# Patient Record
Sex: FEMALE | Race: WHITE | NOT HISPANIC OR LATINO | Employment: OTHER | URBAN - METROPOLITAN AREA
[De-identification: names, ages, dates, MRNs, and addresses within clinical notes are randomized per-mention and may not be internally consistent; named-entity substitution may affect disease eponyms.]

---

## 2017-06-20 ENCOUNTER — OFFICE VISIT (OUTPATIENT)
Dept: OPTOMETRY | Facility: CLINIC | Age: 77
End: 2017-06-20
Payer: COMMERCIAL

## 2017-06-20 DIAGNOSIS — H26.9 CATARACTS, BOTH EYES: ICD-10-CM

## 2017-06-20 DIAGNOSIS — H52.202 ASTIGMATISM OF LEFT EYE: ICD-10-CM

## 2017-06-20 DIAGNOSIS — H43.813 POSTERIOR VITREOUS DETACHMENT, BILATERAL: ICD-10-CM

## 2017-06-20 DIAGNOSIS — H52.4 MYOPIA WITH PRESBYOPIA OF BOTH EYES: Primary | ICD-10-CM

## 2017-06-20 DIAGNOSIS — H52.13 MYOPIA WITH PRESBYOPIA OF BOTH EYES: Primary | ICD-10-CM

## 2017-06-20 PROCEDURE — 92015 DETERMINE REFRACTIVE STATE: CPT | Performed by: OPTOMETRIST

## 2017-06-20 PROCEDURE — 92014 COMPRE OPH EXAM EST PT 1/>: CPT | Performed by: OPTOMETRIST

## 2017-06-20 ASSESSMENT — VISUAL ACUITY
OS_CC: 20/40
OS_CC: 20/40
OS_CC+: -1
OD_CC+: -2
OS_SC: 20/200
OD_CC: 20/60
CORRECTION_TYPE: GLASSES
METHOD: SNELLEN - LINEAR
OS_SC: 20/40
OD_CC: 20/40
OD_SC: 20/60
OD_SC: 20/150

## 2017-06-20 ASSESSMENT — REFRACTION_MANIFEST
OD_ADD: +3.00
OS_CYLINDER: +1.50
OD_CYLINDER: SPHERE
OS_ADD: +3.00
OD_SPHERE: -1.50
OS_AXIS: 125
OS_SPHERE: -2.50

## 2017-06-20 ASSESSMENT — SLIT LAMP EXAM - LIDS
COMMENTS: 2+ DERMATOCHALASIS - UPPER LID
COMMENTS: 2+ DERMATOCHALASIS - UPPER LID, 1+ SCLERAL SHOW

## 2017-06-20 ASSESSMENT — EXTERNAL EXAM - RIGHT EYE: OD_EXAM: 2+ BROW PTOSIS

## 2017-06-20 ASSESSMENT — CUP TO DISC RATIO
OD_RATIO: 0.3
OS_RATIO: 0.4

## 2017-06-20 ASSESSMENT — REFRACTION_WEARINGRX
OD_CYLINDER: +0.75
OD_SPHERE: -1.25
OD_ADD: +3.00
OS_SPHERE: -1.75
OS_AXIS: 120
OS_ADD: +3.00
OS_CYLINDER: +1.00
OD_AXIS: 070

## 2017-06-20 ASSESSMENT — CONF VISUAL FIELD
OS_NORMAL: 1
OD_NORMAL: 1

## 2017-06-20 ASSESSMENT — TONOMETRY
OS_IOP_MMHG: 18
OD_IOP_MMHG: 18
IOP_METHOD: APPLANATION

## 2017-06-20 ASSESSMENT — EXTERNAL EXAM - LEFT EYE: OS_EXAM: 2+ BROW PTOSIS

## 2017-06-20 NOTE — PROGRESS NOTES
Chief Complaint   Patient presents with     COMPREHENSIVE EYE EXAM      Accompanied by self  Last Eye Exam: 1.5 years ago  Dilated Previously: Yes    What are you currently using to see?  glasses       Distance Vision Acuity: Noticed gradual change in both eyes, night vision is difficult has quit driving at night due to glare, long distance signs very difficult to see.  Patient is getting frustrated with vision, seems hazy especially in the morning.    Near Vision Acuity: Satisfied with vision while reading  with glasses    Eye Comfort: good, but  dry at night  Do you use eye drops? : No  Occupation or Hobbies: retired    Sybil Coats, [employer]          Medical, surgical and family histories reviewed and updated 6/20/2017.       OBJECTIVE: See Ophthalmology exam    ASSESSMENT:    ICD-10-CM    1. Myopia with presbyopia of both eyes H52.13 EYE EXAM (SIMPLE-NONBILLABLE)    H52.4 REFRACTION   2. Astigmatism of left eye H52.202 EYE EXAM (SIMPLE-NONBILLABLE)     REFRACTION   3. Cataracts, both eyes H26.9 EYE EXAM (SIMPLE-NONBILLABLE)     OPHTHALMOLOGY ADULT REFERRAL   4. Posterior vitreous detachment, bilateral H43.813 EYE EXAM (SIMPLE-NONBILLABLE)      PLAN:   A final glasses prescription was given.  Fill only if not considering cataract surgery.  Allow time for adaptation.    Referral to ophthalmology for cataract evaluation.    A posterior vitreous detachment is nothing to worry about.  You have a jelly like substance that fills the inside of the eye, as you get older, that gel shrinks a little and when it shrinks, it pulls away from the back of the eye.  When it pulls away you can see a floater, as time goes on, the floater may shrink down out of your line of sight and you won't notice it so often.  There is a little greater risk of developing a retinal detachment since there's nothing pressing against the retina, so if you start to notice flashes of light or sudden vision changes, return to the clinic as  soon as possible, otherwise return as needed.    Return to clinic 1 year for Comprehensive Vision Exam      Cordelia tSafford O.D  [address]  [place]  [address]    [phone]

## 2017-06-20 NOTE — Clinical Note
Andres Coates, I'm referring this patient for a cataract evaluation.  She has given up driving at night due to glare and just says she's very frustrated with her vision.  When I did the refraction today she was still able to read 20/30 plus a few letters with each eye but said it seemed hazy. I didn't do glare testing. She has an appointment set up with you on 8/4/17. Thanks, Cordelia

## 2017-06-20 NOTE — MR AVS SNAPSHOT
After Visit Summary   6/20/2017    Alisa Ballard    MRN: [id number]           Patient Information     Date Of Birth          1940        Visit Information        Provider Department      6/20/2017 11:00 AM Cordelia Stafford OD [facility]        Today's Diagnoses     Myopia with presbyopia of both eyes    -  1    Astigmatism of left eye        Cataracts, both eyes        Posterior vitreous detachment, bilateral          Care Instructions        A final glasses prescription was given.  Fill only if not considering cataract surgery.  Allow time for adaptation.    Referral to ophthalmology for cataract evaluation.    A posterior vitreous detachment is nothing to worry about.  You have a jelly like substance that fills the inside of the eye, as you get older, that gel shrinks a little and when it shrinks, it pulls away from the back of the eye.  When it pulls away you can see a floater, as time goes on, the floater may shrink down out of your line of sight and you won't notice it so often.  There is a little greater risk of developing a retinal detachment since there's nothing pressing against the retina, so if you start to notice flashes of light or sudden vision changes, return to the clinic as soon as possible, otherwise return as needed.    Return to clinic 1 year for Comprehensive Vision Exam      Cordelia Stafford O.D  [facility]  [address] [place] [address]. [place]  [address]    [phone]                  Follow-ups after your visit        Additional Services     OPHTHALMOLOGY ADULT REFERRAL       Your provider has referred you to:  [facility]: [facility] [phone]   [email or web address]      Please be aware that coverage of these services is subject to the terms and limitations of your health insurance plan.  Call member services at your health plan with any benefit or coverage questions.      Please bring the following to  "your appointment:  >>   Any x-rays, CTs or MRIs which have been performed.  Contact the facility where they were done to arrange for  prior to your scheduled appointment.  Any new CT, MRI or other procedures ordered by your specialist must be performed at a [place] facility or coordinated by your clinic's referral office.    >>   List of current medications   >>   This referral request   >>   Any documents/labs given to you for this referral                  Follow-up notes from your care team     Return in about 1 year (around 6/20/2018) for Eye Exam.      Who to contact     If you have questions or need follow up information about today's clinic visit or your schedule please contact [facility] directly at [phone].  Normal or non-critical lab and imaging results will be communicated to you by MyChart, letter or phone within 4 business days after the clinic has received the results. If you do not hear from us within 7 days, please contact the clinic through Myfacepagehart or phone. If you have a critical or abnormal lab result, we will notify you by phone as soon as possible.  Submit refill requests through Sawtooth Ideas or call your pharmacy and they will forward the refill request to us. Please allow 3 business days for your refill to be completed.          Additional Information About Your Visit        Sawtooth Ideas Information     Sawtooth Ideas lets you send messages to your doctor, view your test results, renew your prescriptions, schedule appointments and more. To sign up, go to [email or web address] . Click on \"Log in\" on the left side of the screen, which will take you to the Welcome page. Then click on \"Sign up Now\" on the right side of the page.     You will be asked to enter the access code listed below, as well as some personal information. Please follow the directions to create your username and password.     Your access code is: [email or web address]  Expires: 9/18/2017 12:27 PM     Your access code will "  in 90 days. If you need help or a new code, please call your [facility] clinic or [phone].        Care EveryWhere ID     This is your Care EveryWhere ID. This could be used by other organizations to access your [facility] medical records  [id number]         Blood Pressure from Last 3 Encounters:   13 128/82   08/10/10 98/68   07/13/10 120/73    Weight from Last 3 Encounters:   13 75.8 kg (167 lb)   08/10/10 78.7 kg (173 lb 9.6 oz)   07/13/10 81.3 kg (179 lb 3.2 oz)              We Performed the Following     EYE EXAM (SIMPLE-NONBILLABLE)     OPHTHALMOLOGY ADULT REFERRAL     REFRACTION        Primary Care Provider Office Phone # Fax #    [facility] [phone] [address] [facility] [address]        Thank you!     Thank you for choosing [facility]  for your care. Our goal is always to provide you with excellent care. Hearing back from our patients is one way we can continue to improve our services. Please take a few minutes to complete the written survey that you may receive in the mail after your visit with us. Thank you!             Your Updated Medication List - Protect others around you: Learn how to safely use, store and throw away your medicines at www.disposemymeds.org.          This list is accurate as of: 17 12:27 PM.  Always use your most recent med list.                   Brand Name Dispense Instructions for use    CALCIUM 500 +D PO      daily       GLUCOSAMINE HCL          MULTI-B COMPLEX PO      daily       MULTI-VITAMIN PO          VITAMIN D PO      None Entered

## 2017-06-20 NOTE — PATIENT INSTRUCTIONS
A final glasses prescription was given.  Fill only if not considering cataract surgery.  Allow time for adaptation.    Referral to ophthalmology for cataract evaluation.    A posterior vitreous detachment is nothing to worry about.  You have a jelly like substance that fills the inside of the eye, as you get older, that gel shrinks a little and when it shrinks, it pulls away from the back of the eye.  When it pulls away you can see a floater, as time goes on, the floater may shrink down out of your line of sight and you won't notice it so often.  There is a little greater risk of developing a retinal detachment since there's nothing pressing against the retina, so if you start to notice flashes of light or sudden vision changes, return to the clinic as soon as possible, otherwise return as needed.    Return to clinic 1 year for Comprehensive Vision Exam      Cordelia Stafford O.D  [facility]  [address] [place]  [address]    [phone]

## 2017-08-04 ENCOUNTER — OFFICE VISIT (OUTPATIENT)
Dept: OPHTHALMOLOGY | Facility: CLINIC | Age: 77
End: 2017-08-04
Payer: COMMERCIAL

## 2017-08-04 DIAGNOSIS — H25.813 COMBINED FORMS OF AGE-RELATED CATARACT OF BOTH EYES: Primary | ICD-10-CM

## 2017-08-04 DIAGNOSIS — H43.813 POSTERIOR VITREOUS DETACHMENT, BILATERAL: ICD-10-CM

## 2017-08-04 PROCEDURE — 92004 COMPRE OPH EXAM NEW PT 1/>: CPT | Performed by: OPHTHALMOLOGY

## 2017-08-04 ASSESSMENT — VISUAL ACUITY
METHOD: SNELLEN - LINEAR
OD_PH_CC: 20/40+2
OD_BAT_LOW: 20/30-1
OS_BAT_HIGH: 20/60-1
OD_CC: 20/60
OS_CC+: -2
OS_BAT_LOW: 20/40+2
OD_BAT_MED: 20/50-1
OD_CC+: +2
OS_CC: 20/40
OS_BAT_MED: 20/40+2
CORRECTION_TYPE: GLASSES
METHOD_MR: NO CHARGE, DIAGNOSTIC
OD_BAT_HIGH: 20/80-1

## 2017-08-04 ASSESSMENT — REFRACTION_MANIFEST
OD_SPHERE: -2.00
OS_AXIS: 135
OS_CYLINDER: +0.50
OS_SPHERE: -2.00
OD_AXIS: 180
OD_CYLINDER: +0.50

## 2017-08-04 ASSESSMENT — TONOMETRY
OS_IOP_MMHG: 19
OD_IOP_MMHG: 18
IOP_METHOD: APPLANATION

## 2017-08-04 ASSESSMENT — SLIT LAMP EXAM - LIDS
COMMENTS: 2+ DERMATOCHALASIS - UPPER LID, 1+ SCLERAL SHOW
COMMENTS: 2+ DERMATOCHALASIS - UPPER LID, 1+ SCLERAL SHOW

## 2017-08-04 ASSESSMENT — CUP TO DISC RATIO
OS_RATIO: 0.4
OD_RATIO: 0.3

## 2017-08-04 ASSESSMENT — REFRACTION_WEARINGRX
OS_AXIS: 120
SPECS_TYPE: BIFOCAL
OS_SPHERE: -1.75
OD_CYLINDER: +0.75
OD_AXIS: 070
OD_ADD: +3.00
OD_SPHERE: -1.25
OS_CYLINDER: +1.00
OS_ADD: +3.00

## 2017-08-04 ASSESSMENT — EXTERNAL EXAM - RIGHT EYE: OD_EXAM: 2+ BROW PTOSIS, MILD TO MOD BROW

## 2017-08-04 ASSESSMENT — EXTERNAL EXAM - LEFT EYE: OS_EXAM: 2+ BROW PTOSIS, MILD TO MOD BROW

## 2017-08-04 NOTE — PATIENT INSTRUCTIONS
Plan Kelman phacoemulsification/ posterior chamber lens right eye local standby.  Risks, benefits, complications, and alternatives discussed with patient including possibility of limitations from coexistent eye disease and loss of vision.  Target refraction and multifocal lens options discussed.  Patient understands and consents.  Eligio Coates M.D.

## 2017-08-04 NOTE — PROGRESS NOTES
Current Eye Medications:  None      Subjective:  Here for cataract eval, consult from Dr. Stafford.  Feels the right eye is worse, seems to be more blurred than the left eye.  Quit driving at night because she can't see with the glare.  You did her Mother's cataract surgery Nicole Ryder as well, she has been gone for 12 years. It made a tremendous difference for her eyesight.    RH.  GFT.     Objective:  See Ophthalmology Exam.       Assessment: Visually significant cataract right eye > left eye.      Plan:  Will proceed with cataract surgery right eye.  Plan Kelman phacoemulsification/ posterior chamber lens right eye local standby.  Risks, benefits, complications, and alternatives discussed with patient including possibility of limitations from coexistent eye disease and loss of vision.  Target refraction and multifocal lens options discussed.  Patient understands and consents.  Eligio Coates M.D.

## 2017-08-04 NOTE — MR AVS SNAPSHOT
"              After Visit Summary   8/4/2017    Alisa Ballard    MRN: [id number]           Patient Information     Date Of Birth          1940        Visit Information        Provider Department      8/4/2017 10:15 AM Eligio Coates MD [facility]        Today's Diagnoses     Posterior vitreous detachment, bilateral    -  1    Combined forms of age-related cataract of both eyes          Care Instructions    Plan Kelman phacoemulsification/ posterior chamber lens right eye local standby.  Risks, benefits, complications, and alternatives discussed with patient including possibility of limitations from coexistent eye disease and loss of vision.  Target refraction and multifocal lens options discussed.  Patient understands and consents.  Eligio Coates M.D.            Follow-ups after your visit        Who to contact     If you have questions or need follow up information about today's clinic visit or your schedule please contact [facility] directly at [phone].  Normal or non-critical lab and imaging results will be communicated to you by Sleepy'shart, letter or phone within 4 business days after the clinic has received the results. If you do not hear from us within 7 days, please contact the clinic through Sleepy'shart or phone. If you have a critical or abnormal lab result, we will notify you by phone as soon as possible.  Submit refill requests through Partnered or call your pharmacy and they will forward the refill request to us. Please allow 3 business days for your refill to be completed.          Additional Information About Your Visit        Sleepy'sharCareland Information     Partnered lets you send messages to your doctor, view your test results, renew your prescriptions, schedule appointments and more. To sign up, go to [email or web address] . Click on \"Log in\" on the left side of the screen, which will take you to the Welcome page. Then click on \"Sign up Now\" on the right side of the " page.     You will be asked to enter the access code listed below, as well as some personal information. Please follow the directions to create your username and password.     Your access code is: [email or web address]  Expires: 2017 12:27 PM     Your access code will  in 90 days. If you need help or a new code, please call your [facility] clinic or [phone].        Care EveryWhere ID     This is your Care EveryWhere ID. This could be used by other organizations to access your [facility] medical records  [id number]         Blood Pressure from Last 3 Encounters:   13 128/82   08/10/10 98/68   07/13/10 120/73    Weight from Last 3 Encounters:   13 75.8 kg (167 lb)   08/10/10 78.7 kg (173 lb 9.6 oz)   07/13/10 81.3 kg (179 lb 3.2 oz)              Today, you had the following     No orders found for display       Primary Care Provider Office Phone # Fax #    [facility] [phone] [address] [facility] [address]        Equal Access to Services     CAMPOS BOLANOS : Hadii aad ku hadasho Soomaali, waaxda luqadaha, qaybta kaalmada adeegyada, micky long. So [facility] [phone].    ATENCIÓN: Si habla español, tiene a edmond disposición servicios gratuitos de asistencia lingüística. [facility] [phone].    We comply with applicable federal civil rights laws and [place] laws. We do not discriminate on the basis of race, color, national origin, age, disability sex, sexual orientation or gender identity.            Thank you!     Thank you for choosing [facility]  for your care. Our goal is always to provide you with excellent care. Hearing back from our patients is one way we can continue to improve our services. Please take a few minutes to complete the written survey that you may receive in the mail after your visit with us. Thank you!             Your Updated Medication List - Protect others around you: Learn how to  safely use, store and throw away your medicines at www.disposemymeds.org.          This list is accurate as of: 8/4/17 11:23 AM.  Always use your most recent med list.                   Brand Name Dispense Instructions for use Diagnosis    CALCIUM 500 +D PO      daily        GLUCOSAMINE HCL           MULTI-B COMPLEX PO      daily        MULTI-VITAMIN PO           VITAMIN D PO      None Entered

## 2017-08-05 PROBLEM — H25.813 COMBINED FORMS OF AGE-RELATED CATARACT OF BOTH EYES: Status: ACTIVE | Noted: 2017-08-05

## 2017-09-07 ENCOUNTER — OFFICE VISIT (OUTPATIENT)
Dept: OPHTHALMOLOGY | Facility: CLINIC | Age: 77
End: 2017-09-07
Payer: COMMERCIAL

## 2017-09-07 DIAGNOSIS — H25.813 COMBINED FORMS OF AGE-RELATED CATARACT OF BOTH EYES: Primary | ICD-10-CM

## 2017-09-07 PROCEDURE — 92136 OPHTHALMIC BIOMETRY: CPT | Mod: TC | Performed by: OPHTHALMOLOGY

## 2017-09-07 PROCEDURE — 92012 INTRM OPH EXAM EST PATIENT: CPT | Performed by: OPHTHALMOLOGY

## 2017-09-07 RX ORDER — PREDNISOLONE ACETATE 10 MG/ML
1 SUSPENSION/ DROPS OPHTHALMIC 3 TIMES DAILY
Qty: 5 ML | Refills: 0 | Status: SHIPPED | OUTPATIENT
Start: 2017-09-15 | End: 2017-11-28

## 2017-09-07 RX ORDER — MOXIFLOXACIN 5 MG/ML
1 SOLUTION/ DROPS OPHTHALMIC 3 TIMES DAILY
Qty: 1 BOTTLE | Refills: 0 | Status: SHIPPED | OUTPATIENT
Start: 2017-09-13 | End: 2018-06-14

## 2017-09-07 RX ORDER — DICLOFENAC SODIUM 1 MG/ML
1 SOLUTION/ DROPS OPHTHALMIC 3 TIMES DAILY
Qty: 5 ML | Refills: 0 | Status: SHIPPED | OUTPATIENT
Start: 2017-09-15 | End: 2017-11-28

## 2017-09-07 ASSESSMENT — VISUAL ACUITY
OS_CC: 20/50
CORRECTION_TYPE: GLASSES
OS_CC+: -2
METHOD: SNELLEN - LINEAR
OD_CC: 20/60
OD_CC+: -1

## 2017-09-07 ASSESSMENT — EXTERNAL EXAM - LEFT EYE: OS_EXAM: 2+ BROW PTOSIS, MILD TO MOD BROW

## 2017-09-07 ASSESSMENT — EXTERNAL EXAM - RIGHT EYE: OD_EXAM: 2+ BROW PTOSIS, MILD TO MOD BROW

## 2017-09-07 NOTE — PROGRESS NOTES
Current Eye Medications:    None      Subjective:  Cataract surgery right eye 9/14/17. Vision is very blurred, or hazy right eye>left eye. Worse in morning. Zero pain, or discomfort both eyes.     Objective:  See Ophthalmology Exam.       Assessment: Visually significant cataract right eye.       Plan:  Plan Kelman phacoemulsification/ posterior chamber lens right eye local standby.  Risks, benefits, complications, and alternatives discussed with patient including possibility of limitations from coexistent eye disease and loss of vision.  Target refraction and multifocal lens options discussed.  Patient understands and consents.  Eligio Coates M.D.

## 2017-09-07 NOTE — PATIENT INSTRUCTIONS
PRE-OP CATARACT INSTRUCTIONS    *You will be picking up 3 eye drop bottles from your pharmacy.    *Use the following drops in the right eye  3 times a day the day before surgery and once the morning of surgery:                                    Vigamox (tan cap)    *If taking more than one drop, wait five minutes between drops.    *No solid food after midnight.    *Clear liquids: coffee (no cream), tea, water, and jello are OK before 6:30 A.M.  You may take your regular pills with this.    *If you are taking glaucoma drops, continue as usual.    Eligio Coates M.D.

## 2017-09-07 NOTE — MR AVS SNAPSHOT
After Visit Summary   9/7/2017    Alisa Ballard    MRN: [id number]           Patient Information     Date Of Birth          1940        Visit Information        Provider Department      9/7/2017 1:15 PM Eligio Coates MD [facility] [place]        Today's Diagnoses     Combined forms of age-related cataract of both eyes    -  1      Care Instructions    PRE-OP CATARACT INSTRUCTIONS    *You will be picking up 3 eye drop bottles from your pharmacy.    *Use the following drops in the right eye  3 times a day the day before surgery and once the morning of surgery:                                    Vigamox (tan cap)    *If taking more than one drop, wait five minutes between drops.    *No solid food after midnight.    *Clear liquids: coffee (no cream), tea, water, and jello are OK before 6:30 A.M.  You may take your regular pills with this.    *If you are taking glaucoma drops, continue as usual.    Eligio Coates M.D.              Follow-ups after your visit        Your next 10 appointments already scheduled     Sep 15, 2017 10:45 AM CDT   [facility]    [address] [place] [address]   [phone]            Sep 21, 2017  9:15 AM CDT   [facility]    [address] [place] [address]   [phone]            Oct 25, 2017 10:15 AM CDT   [facility] [place] ([address] [address]   [phone]              Who to contact     If you have questions or need follow up information about today's clinic visit or your schedule please contact [facility] directly at [phone].  Normal or non-critical lab and imaging results will be communicated to you by MyChart, letter or phone within 4 business days  "after the clinic has received the results. If you do not hear from us within 7 days, please contact the clinic through FanMiles or phone. If you have a critical or abnormal lab result, we will notify you by phone as soon as possible.  Submit refill requests through FanMiles or call your pharmacy and they will forward the refill request to us. Please allow 3 business days for your refill to be completed.          Additional Information About Your Visit        FanMiles Information     FanMiles lets you send messages to your doctor, view your test results, renew your prescriptions, schedule appointments and more. To sign up, go to [email or web address] . Click on \"Log in\" on the left side of the screen, which will take you to the Welcome page. Then click on \"Sign up Now\" on the right side of the page.     You will be asked to enter the access code listed below, as well as some personal information. Please follow the directions to create your username and password.     Your access code is: [email or web address]  Expires: 2017 12:27 PM     Your access code will  in 90 days. If you need help or a new code, please call your [place] clinic or [phone].        Care EveryWhere ID     This is your Care EveryWhere ID. This could be used by other organizations to access your [place] medical records  [id number]         Blood Pressure from Last 3 Encounters:   13 128/82   08/10/10 98/68   07/13/10 120/73    Weight from Last 3 Encounters:   13 75.8 kg (167 lb)   08/10/10 78.7 kg (173 lb 9.6 oz)   07/13/10 81.3 kg (179 lb 3.2 oz)              We Performed the Following     OPHTHALMIC BIOMETY W IOL POWER CALC     OPHTHALMIC BIOMETY W IOL POWER CALC          Today's Medication Changes          These changes are accurate as of: 17  2:38 PM.  If you have any questions, ask your nurse or doctor.               Start taking these medicines.        Dose/Directions    diclofenac 0.1 % ophthalmic solution   Commonly " known as:  VOLTAREN   Used for:  Combined forms of age-related cataract of both eyes        Dose:  1 drop   Start taking on:  9/15/2017   Place 1 drop into the right eye 3 times daily   Quantity:  5 mL   Refills:  0       moxifloxacin 0.5 % ophthalmic solution   Commonly known as:  VIGAMOX   Used for:  Combined forms of age-related cataract of both eyes        Dose:  1 drop   Start taking on:  9/13/2017   Place 1 drop into the right eye 3 times daily   Quantity:  1 Bottle   Refills:  0       prednisoLONE acetate 1 % ophthalmic susp   Commonly known as:  PRED FORTE   Used for:  Combined forms of age-related cataract of both eyes        Dose:  1 drop   Start taking on:  9/15/2017   Place 1 drop into the right eye 3 times daily   Quantity:  5 mL   Refills:  0            Where to get your medicines      These medications were sent to [id number] - [place], [place] - [address]  [address] [place] [address]     Phone:  [phone]     diclofenac 0.1 % ophthalmic solution    moxifloxacin 0.5 % ophthalmic solution    prednisoLONE acetate 1 % ophthalmic susp                Primary Care Provider Office Phone # Fax #    [facility] [place] [phone] [address] [facility] [address]        Equal Access to Services     SYLVIA BOLANOS AH: Gonsalo lance hadasho Soomaali, waaxda luqadaha, qaybta kaalmada adeegyada, micky long. So [facility] [phone].    ATENCIÓN: Si habla español, tiene a edmond disposición servicios gratuitos de asistencia lingüística. Llame al [phone].    We comply with applicable federal civil rights laws and [place] laws. We do not discriminate on the basis of race, color, national origin, age, disability sex, sexual orientation or gender identity.            Thank you!     Thank you for choosing [facility]  for your care. Our goal is always to provide you with excellent care. Hearing back from our patients is  one way we can continue to improve our services. Please take a few minutes to complete the written survey that you may receive in the mail after your visit with us. Thank you!             Your Updated Medication List - Protect others around you: Learn how to safely use, store and throw away your medicines at www.disposemymeds.org.          This list is accurate as of: 9/7/17  2:38 PM.  Always use your most recent med list.                   Brand Name Dispense Instructions for use Diagnosis    CALCIUM 500 +D PO      daily        diclofenac 0.1 % ophthalmic solution   Start taking on:  9/15/2017    VOLTAREN    5 mL    Place 1 drop into the right eye 3 times daily    Combined forms of age-related cataract of both eyes       GLUCOSAMINE HCL           moxifloxacin 0.5 % ophthalmic solution   Start taking on:  9/13/2017    VIGAMOX    1 Bottle    Place 1 drop into the right eye 3 times daily    Combined forms of age-related cataract of both eyes       MULTI-B COMPLEX PO      daily        MULTI-VITAMIN PO           prednisoLONE acetate 1 % ophthalmic susp   Start taking on:  9/15/2017    PRED FORTE    5 mL    Place 1 drop into the right eye 3 times daily    Combined forms of age-related cataract of both eyes       VITAMIN D PO      None Entered

## 2017-09-15 ENCOUNTER — OFFICE VISIT (OUTPATIENT)
Dept: OPHTHALMOLOGY | Facility: CLINIC | Age: 77
End: 2017-09-15
Payer: COMMERCIAL

## 2017-09-15 DIAGNOSIS — Z96.1 PSEUDOPHAKIA: Primary | ICD-10-CM

## 2017-09-15 PROBLEM — H25.813 COMBINED FORMS OF AGE-RELATED CATARACT OF BOTH EYES: Status: RESOLVED | Noted: 2017-08-05 | Resolved: 2017-09-15

## 2017-09-15 PROBLEM — H25.812 COMBINED FORMS OF AGE-RELATED CATARACT OF LEFT EYE: Status: ACTIVE | Noted: 2017-09-15

## 2017-09-15 PROCEDURE — 99024 POSTOP FOLLOW-UP VISIT: CPT | Performed by: OPHTHALMOLOGY

## 2017-09-15 ASSESSMENT — VISUAL ACUITY
OD_SC: 20/70
OD_PH_SC: 20/30-1
METHOD: SNELLEN - LINEAR
OD_SC+: -1
OS_CC: 20/50

## 2017-09-15 ASSESSMENT — TONOMETRY: OD_IOP_MMHG: 20

## 2017-09-15 ASSESSMENT — SLIT LAMP EXAM - LIDS: COMMENTS: NORMAL

## 2017-09-15 NOTE — PROGRESS NOTES
Current Eye Medications:  Prednisolone, Vigmamox, Diclofenac TID right eye.     Subjective:  Po1, Cataract surgery right eye 9/14/17. Patient slept pretty good. Zero pain both eyes. Vision is very bright, improved. Feels eyes not functioning together well.    Had nausea post op.     Objective:  See Ophthalmology Exam.       Assessment: Doing well status/post Kelman phacoemulsification/ posterior chamber lens right eye.      Plan:   See Patient Instructions.

## 2017-09-15 NOTE — PATIENT INSTRUCTIONS
POST-OP CATARACT INSTRUCTIONS    Use the following drops in the right eye:    Vigamox (tan cap) 3 times a day through end of Tuesday, then put remainder in fridge.    Continue:  Prednisolone (pink or white cap) 3 times a day  Diclofenac (gray cap) 3 times a day    ~Wait at least 5 minutes between drops.    ~Wear eye shield at night for one week.    ~Do not exert yourself to the point that you are red in the face for one week.    ~If your vision worsens, eye becomes increasingly red, or becomes painful, call [phone].    ~If you are taking glaucoma medications, continue as usual.    ~OK to resume aspirin and/or other blood thinners.    Eligio Coates M.D.

## 2017-09-15 NOTE — MR AVS SNAPSHOT
After Visit Summary   9/15/2017    Alisa Ballard    MRN: [id number]           Patient Information     Date Of Birth          1940        Visit Information        Provider Department      9/15/2017 10:45 AM Eligio Coates MD [facility]        Today's Diagnoses     Pseudophakia, od    -  1      Care Instructions    POST-OP CATARACT INSTRUCTIONS    Use the following drops in the right eye:    Vigamox (tan cap) 3 times a day through end of Tuesday, then put remainder in fridge.    Continue:  Prednisolone (pink or white cap) 3 times a day  Diclofenac (gray cap) 3 times a day    ~Wait at least 5 minutes between drops.    ~Wear eye shield at night for one week.    ~Do not exert yourself to the point that you are red in the face for one week.    ~If your vision worsens, eye becomes increasingly red, or becomes painful, call [phone].    ~If you are taking glaucoma medications, continue as usual.    ~OK to resume aspirin and/or other blood thinners.    Eligio Coates M.D.                Follow-ups after your visit        Your next 10 appointments already scheduled     Sep 21, 2017  9:15 AM CDT   Return Visit with Eligio Coates MD   [facility] ([address] [address] [phone]            Oct 25, 2017 10:15 AM CDT   Return Visit with Eligio Coates MD   [facility] ([address] [address]   [phone]              Who to contact     If you have questions or need follow up information about today's clinic visit or your schedule please contact [facility] directly at [phone].  Normal or non-critical lab and imaging results will be communicated to you by MyChart, letter or phone within 4 business days after the clinic has received the results. If you do not hear from us within 7 days, please contact the clinic through MyChart or  "phone. If you have a critical or abnormal lab result, we will notify you by phone as soon as possible.  Submit refill requests through [employer] or call your pharmacy and they will forward the refill request to us. Please allow 3 business days for your refill to be completed.          Additional Information About Your Visit        [employer] Passenger[employer] Information     [employer] lets you send messages to your doctor, view your test results, renew your prescriptions, schedule appointments and more. To sign up, go to [email or web address] . Click on \"Log in\" on the left side of the screen, which will take you to the Welcome page. Then click on \"Sign up Now\" on the right side of the page.     You will be asked to enter the access code listed below, as well as some personal information. Please follow the directions to create your username and password.     Your access code is: [email or web address]  Expires: 2017 12:27 PM     Your access code will  in 90 days. If you need help or a new code, please call your [place] clinic or [phone].        Care EveryWhere ID     This is your Care EveryWhere ID. This could be used by other organizations to access your [place] medical records  [id number]         Blood Pressure from Last 3 Encounters:   13 128/82   08/10/10 98/68   07/13/10 120/73    Weight from Last 3 Encounters:   13 75.8 kg (167 lb)   08/10/10 78.7 kg (173 lb 9.6 oz)   07/13/10 81.3 kg (179 lb 3.2 oz)              Today, you had the following     No orders found for display       Primary Care Provider Office Phone # Fax #    [facility] [phone] [address] [facility] [address]        Equal Access to Services     CAMPOS BOLANOS : Gonsalo Noguera, david fernández, qanataliya kaalyue pandey, micky long. Formerly [facility] [phone].    ATENCIÓN: Si habla español, tiene a edmond disposición servicios gratuitos de asistencia " lingüística. Jakub al [phone].    We comply with applicable federal civil rights laws and [place] laws. We do not discriminate on the basis of race, color, national origin, age, disability sex, sexual orientation or gender identity.            Thank you!     Thank you for choosing [facility]  for your care. Our goal is always to provide you with excellent care. Hearing back from our patients is one way we can continue to improve our services. Please take a few minutes to complete the written survey that you may receive in the mail after your visit with us. Thank you!             Your Updated Medication List - Protect others around you: Learn how to safely use, store and throw away your medicines at www.disposemymeds.org.          This list is accurate as of: 9/15/17 11:33 AM.  Always use your most recent med list.                   Brand Name Dispense Instructions for use Diagnosis    CALCIUM 500 +D PO      daily        diclofenac 0.1 % ophthalmic solution    VOLTAREN    5 mL    Place 1 drop into the right eye 3 times daily    Combined forms of age-related cataract of both eyes       GLUCOSAMINE HCL           moxifloxacin 0.5 % ophthalmic solution    VIGAMOX    1 Bottle    Place 1 drop into the right eye 3 times daily    Combined forms of age-related cataract of both eyes       MULTI-B COMPLEX PO      daily        MULTI-VITAMIN PO           prednisoLONE acetate 1 % ophthalmic susp    PRED FORTE    5 mL    Place 1 drop into the right eye 3 times daily    Combined forms of age-related cataract of both eyes       VITAMIN D PO      None Entered

## 2017-09-21 ENCOUNTER — OFFICE VISIT (OUTPATIENT)
Dept: OPHTHALMOLOGY | Facility: CLINIC | Age: 77
End: 2017-09-21
Payer: COMMERCIAL

## 2017-09-21 DIAGNOSIS — Z96.1 PSEUDOPHAKIA: Primary | ICD-10-CM

## 2017-09-21 PROCEDURE — 99024 POSTOP FOLLOW-UP VISIT: CPT | Performed by: OPHTHALMOLOGY

## 2017-09-21 ASSESSMENT — REFRACTION_MANIFEST
OD_AXIS: 121
OD_CYLINDER: +1.25
OD_SPHERE: -0.75

## 2017-09-21 ASSESSMENT — VISUAL ACUITY
OD_SC: 20/40-2
METHOD: SNELLEN - LINEAR

## 2017-09-21 ASSESSMENT — TONOMETRY
IOP_METHOD: APPLANATION
OS_IOP_MMHG: 18

## 2017-09-21 ASSESSMENT — SLIT LAMP EXAM - LIDS: COMMENTS: NORMAL

## 2017-09-21 NOTE — MR AVS SNAPSHOT
"              After Visit Summary   9/21/2017    Alisa Ballard    MRN: [id number]           Patient Information     Date Of Birth          1940        Visit Information        Provider Department      9/21/2017 9:15 AM Eligio Coates MD [facility]        Today's Diagnoses     Pseudophakia, od    -  1      Care Instructions    1)   Continue Diclofenc (gray) and prednisolone (pink) three times daily until each is gone.    2)   Stop shield one week following surgery.    3)   No eye rubbing.    4)   Return one month for final exam.     Eligio Coates M.D.            Follow-ups after your visit        Your next 10 appointments already scheduled     Oct 25, 2017 10:15 AM CDT   Return Visit with Eligio Coates MD   [facility] ([facility]    [address] [facility] [id number] [phone]              Who to contact     If you have questions or need follow up information about today's clinic visit or your schedule please contact [facility] directly at [phone].  Normal or non-critical lab and imaging results will be communicated to you by [employer] Ph[employer], letter or phone within 4 business days after the clinic has received the results. If you do not hear from us within 7 days, please contact the clinic through [employer] or phone. If you have a critical or abnormal lab result, we will notify you by phone as soon as possible.  Submit refill requests through [employer] or call your pharmacy and they will forward the refill request to us. Please allow 3 business days for your refill to be completed.          Additional Information About Your Visit        [employer] Information     [employer] lets you send messages to your doctor, view your test results, renew your prescriptions, schedule appointments and more. To sign up, go to [email or web address] . Click on \"Log in\" on the left side of the screen, which will take you to the Welcome page. Then click on " "\"Sign up Now\" on the right side of the page.     You will be asked to enter the access code listed below, as well as some personal information. Please follow the directions to create your username and password.     Your access code is: [email or web address]  Expires: 2017 10:30 AM     Your access code will  in 90 days. If you need help or a new code, please call your [place] clinic or [phone].        Care EveryWhere ID     This is your Care EveryWhere ID. This could be used by other organizations to access your [place] medical records  [id number]         Blood Pressure from Last 3 Encounters:   13 128/82   08/10/10 98/68   07/13/10 120/73    Weight from Last 3 Encounters:   13 75.8 kg (167 lb)   08/10/10 78.7 kg (173 lb 9.6 oz)   07/13/10 81.3 kg (179 lb 3.2 oz)              Today, you had the following     No orders found for display       Primary Care Provider Office Phone # Fax #    [facility] [phone] [address] [facility] [address]        Equal Access to Services     SYLVIA BOLANOS AH: Hadii silvio lance hadasho Soomaali, waaxda luqadaha, qaybta kaalmada adeegyada, micky long. So [facility] [phone].    ATENCIÓN: Si habla español, tiene a edmond disposición servicios gratuitos de asistencia lingüística. Llame al [phone].    We comply with applicable federal civil rights laws and [place] laws. We do not discriminate on the basis of race, color, national origin, age, disability sex, sexual orientation or gender identity.            Thank you!     Thank you for choosing [facility]  for your care. Our goal is always to provide you with excellent care. Hearing back from our patients is one way we can continue to improve our services. Please take a few minutes to complete the written survey that you may receive in the mail after your visit with us. Thank you!             Your Updated Medication List - " Protect others around you: Learn how to safely use, store and throw away your medicines at www.disposemymeds.org.          This list is accurate as of: 9/21/17 10:30 AM.  Always use your most recent med list.                   Brand Name Dispense Instructions for use Diagnosis    CALCIUM 500 +D PO      daily        diclofenac 0.1 % ophthalmic solution    VOLTAREN    5 mL    Place 1 drop into the right eye 3 times daily    Combined forms of age-related cataract of both eyes       GLUCOSAMINE HCL           moxifloxacin 0.5 % ophthalmic solution    VIGAMOX    1 Bottle    Place 1 drop into the right eye 3 times daily    Combined forms of age-related cataract of both eyes       MULTI-B COMPLEX PO      daily        MULTI-VITAMIN PO           prednisoLONE acetate 1 % ophthalmic susp    PRED FORTE    5 mL    Place 1 drop into the right eye 3 times daily    Combined forms of age-related cataract of both eyes       VITAMIN D PO      None Entered

## 2017-09-21 NOTE — PROGRESS NOTES
Current Eye Medications:  diclofenac/1%prednisolone     Subjective:  Po2 left eye  Pt reports she was really quite concerned her right eye was so red the day after her surgery, had no pain and vision seem ok. The eye is much less red now.    [place] in October.  Consider left eye at final.     Objective:  See Ophthalmology Exam.       Assessment: Doing well status/post Kelman phacoemulsification/ posterior chamber lens right eye.      Plan:   See Patient Instructions.

## 2017-09-21 NOTE — PATIENT INSTRUCTIONS
1)   Continue Diclofenc (gray) and prednisolone (pink) three times daily until each is gone.    2)   Stop shield one week following surgery.    3)   No eye rubbing.    4)   Return one month for final exam.     Eligio Coates M.D.

## 2017-11-01 ENCOUNTER — OFFICE VISIT (OUTPATIENT)
Dept: OPHTHALMOLOGY | Facility: CLINIC | Age: 77
End: 2017-11-01
Payer: COMMERCIAL

## 2017-11-01 DIAGNOSIS — H25.812 COMBINED FORMS OF AGE-RELATED CATARACT OF LEFT EYE: ICD-10-CM

## 2017-11-01 DIAGNOSIS — Z96.1 PSEUDOPHAKIA: Primary | ICD-10-CM

## 2017-11-01 PROCEDURE — 99024 POSTOP FOLLOW-UP VISIT: CPT | Performed by: OPHTHALMOLOGY

## 2017-11-01 ASSESSMENT — REFRACTION_WEARINGRX
OS_ADD: +3.00
OD_SPHERE: -1.25
OD_ADD: +3.00
OS_CYLINDER: +1.00
OD_CYLINDER: +0.75
OS_SPHERE: -1.75
OS_AXIS: 120
OD_AXIS: 070
SPECS_TYPE: BIFOCAL

## 2017-11-01 ASSESSMENT — VISUAL ACUITY
OS_PH_CC: 20/30
METHOD: SNELLEN - LINEAR
OS_CC: 20/60
OD_PH_CC: 20/25
OS_CC+: -2
CORRECTION_TYPE: GLASSES
OD_CC: 20/40
OD_CC+: -1

## 2017-11-01 ASSESSMENT — EXTERNAL EXAM - LEFT EYE: OS_EXAM: 2+ BROW PTOSIS, MILD TO MOD BROW

## 2017-11-01 ASSESSMENT — REFRACTION_MANIFEST
OD_SPHERE: -1.25
OD_CYLINDER: +0.75
OD_AXIS: 112
OD_ADD: +2.50

## 2017-11-01 ASSESSMENT — EXTERNAL EXAM - RIGHT EYE: OD_EXAM: 2+ BROW PTOSIS

## 2017-11-01 ASSESSMENT — CUP TO DISC RATIO: OD_RATIO: 0.3

## 2017-11-01 ASSESSMENT — TONOMETRY
OS_IOP_MMHG: 17
IOP_METHOD: APPLANATION
OD_IOP_MMHG: 13

## 2017-11-01 NOTE — MR AVS SNAPSHOT
"              After Visit Summary   2017    Alisa Ballard    MRN: [id number]           Patient Information     Date Of Birth          1940        Visit Information        Provider Department      2017 10:15 AM Eligio Coates MD [facility]        Care Instructions    1)  Discontinue all drops, unless used prior to cataract surgery.    2)   Offered cataract surgery left eye  at anytime. Call [facility] @ [phone] to schedule.      Eligio Coates M.D.                   Follow-ups after your visit        Who to contact     If you have questions or need follow up information about today's clinic visit or your schedule please contact [facility] directly at [phone].  Normal or non-critical lab and imaging results will be communicated to you by Truckilyhart, letter or phone within 4 business days after the clinic has received the results. If you do not hear from us within 7 days, please contact the clinic through Truckilyhart or phone. If you have a critical or abnormal lab result, we will notify you by phone as soon as possible.  Submit refill requests through [employer] or call your pharmacy and they will forward the refill request to us. Please allow 3 business days for your refill to be completed.          Additional Information About Your Visit        [employer] Information     [employer] lets you send messages to your doctor, view your test results, renew your prescriptions, schedule appointments and more. To sign up, go to [email or web address] . Click on \"Log in\" on the left side of the screen, which will take you to the Welcome page. Then click on \"Sign up Now\" on the right side of the page.     You will be asked to enter the access code listed below, as well as some personal information. Please follow the directions to create your username and password.     Your access code is: [email or web address]  Expires: 2017 10:30 AM     Your access code will  in 90 days. If you " need help or a new code, please call your [place] clinic or [phone].        Care EveryWhere ID     This is your Care EveryWhere ID. This could be used by other organizations to access your [place] medical records  [id number]         Blood Pressure from Last 3 Encounters:   05/14/13 128/82   08/10/10 98/68   07/13/10 120/73    Weight from Last 3 Encounters:   05/14/13 75.8 kg (167 lb)   08/10/10 78.7 kg (173 lb 9.6 oz)   07/13/10 81.3 kg (179 lb 3.2 oz)              Today, you had the following     No orders found for display       Primary Care Provider Office Phone # Fax #    [facility] [phone] [address] [facility] [address]        Equal Access to Services     SYLVIA BOLANOS : Hadii silvio samuels Soluis, waaxda luqadaha, qaybta kaalmada adematthiasyasusie, micky coffey . So [facility] [phone].    ATENCIÓN: Si habla español, tiene a edmond disposición servicios gratuitos de asistencia lingüística. Jakub al [phone].    We comply with applicable federal civil rights laws and [place] laws. We do not discriminate on the basis of race, color, national origin, age, disability, sex, sexual orientation, or gender identity.            Thank you!     Thank you for choosing [facility] [place]  for your care. Our goal is always to provide you with excellent care. Hearing back from our patients is one way we can continue to improve our services. Please take a few minutes to complete the written survey that you may receive in the mail after your visit with us. Thank you!             Your Updated Medication List - Protect others around you: Learn how to safely use, store and throw away your medicines at www.disposemymeds.org.          This list is accurate as of: 11/1/17 11:33 AM.  Always use your most recent med list.                   Brand Name Dispense Instructions for use Diagnosis    CALCIUM 500 +D PO      daily        diclofenac 0.1  % ophthalmic solution    VOLTAREN    5 mL    Place 1 drop into the right eye 3 times daily    Combined forms of age-related cataract of both eyes       GLUCOSAMINE HCL           moxifloxacin 0.5 % ophthalmic solution    VIGAMOX    1 Bottle    Place 1 drop into the right eye 3 times daily    Combined forms of age-related cataract of both eyes       MULTI-B COMPLEX PO      daily        MULTI-VITAMIN PO           prednisoLONE acetate 1 % ophthalmic susp    PRED FORTE    5 mL    Place 1 drop into the right eye 3 times daily    Combined forms of age-related cataract of both eyes       VITAMIN D PO      None Entered

## 2017-11-01 NOTE — PATIENT INSTRUCTIONS
1)  Discontinue all drops, unless used prior to cataract surgery.    2)   Offered cataract surgery left eye  at anytime. Call [facility] @ [phone] to schedule.      Eligio Coates M.D.

## 2017-11-01 NOTE — PROGRESS NOTES
Current Eye Medications:  no     Subjective:  Po3, KPE right eye 9/14/17. Vision is doing very well right eye, Color is much better. Having little trouble on computer now, feels needs to get left eye cataract surgery done. Looks yellowish brown(smokey haze) Left eye. Zero pain, or discomfort both eyes. Patient seeing half moon reflection in right eye peripheral, from some lights. Wondering if that is lens implant.     Objective:  See Ophthalmology Exam.       Assessment: Doing well status/post Kelman phacoemulsification/ posterior chamber lens right eye.    Visually significant cataract left eye.       Plan:  Will proceed with cataract surgery left eye.  Plan Kelman phacoemulsification/ posterior chamber lens left eye local standby.  Risks, benefits, complications, and alternatives discussed with patient including possibility of limitations from coexistent eye disease and loss of vision.  Target refraction and multifocal lens options discussed.  Patient understands and consents.  Eligio Coates M.D.

## 2017-11-20 ENCOUNTER — OFFICE VISIT (OUTPATIENT)
Dept: OPHTHALMOLOGY | Facility: CLINIC | Age: 77
End: 2017-11-20
Payer: COMMERCIAL

## 2017-11-20 DIAGNOSIS — H25.812 COMBINED FORMS OF AGE-RELATED CATARACT OF LEFT EYE: Primary | ICD-10-CM

## 2017-11-20 PROCEDURE — 92012 INTRM OPH EXAM EST PATIENT: CPT | Mod: 24 | Performed by: OPHTHALMOLOGY

## 2017-11-20 PROCEDURE — 92136 OPHTHALMIC BIOMETRY: CPT | Mod: 26 | Performed by: OPHTHALMOLOGY

## 2017-11-20 RX ORDER — DICLOFENAC SODIUM 1 MG/ML
1 SOLUTION/ DROPS OPHTHALMIC 3 TIMES DAILY
Qty: 5 ML | Refills: 0 | Status: SHIPPED | OUTPATIENT
Start: 2017-11-28 | End: 2018-06-14

## 2017-11-20 RX ORDER — PREDNISOLONE ACETATE 10 MG/ML
1 SUSPENSION/ DROPS OPHTHALMIC 3 TIMES DAILY
Qty: 5 ML | Refills: 0 | Status: SHIPPED | OUTPATIENT
Start: 2017-11-28 | End: 2018-06-14

## 2017-11-20 RX ORDER — MOXIFLOXACIN HCL 0.5 %
1 DROPS OPHTHALMIC (EYE) 3 TIMES DAILY
Qty: 2.5 ML | Refills: 0 | Status: CANCELLED | OUTPATIENT
Start: 2017-11-26

## 2017-11-20 ASSESSMENT — EXTERNAL EXAM - RIGHT EYE: OD_EXAM: 2+ BROW PTOSIS

## 2017-11-20 ASSESSMENT — EXTERNAL EXAM - LEFT EYE: OS_EXAM: 2+ BROW PTOSIS, MILD TO MOD BROW

## 2017-11-20 ASSESSMENT — VISUAL ACUITY
METHOD: SNELLEN - LINEAR
CORRECTION_TYPE: GLASSES
OS_SC: 20/50+3

## 2017-11-20 NOTE — PATIENT INSTRUCTIONS
PRE-OP CATARACT INSTRUCTIONS    *You will be picking up 2 eye drop bottles from your pharmacy.    *Use the following drops in the left eye  3 times a day the day before surgery and once the morning of surgery:                                    Vigamox (tan cap)    *If taking more than one drop, wait five minutes between drops.    *No solid food after midnight.    *Clear liquids: coffee (no cream), tea, water, and jello are OK before 6:30 A.M.  You may take your regular pills with this.    *If you are taking glaucoma drops, continue as usual.    Eligio Coates M.D.

## 2017-11-20 NOTE — PROGRESS NOTES
Current Eye Medications:       Subjective:  preop kpe os   As above scheduled on 11/27/2017.     Objective:  See Ophthalmology Exam.       Assessment: Visually significant cataract left eye.       Plan: Plan Kelman phacoemulsification/ posterior chamber lens left eye local standby.  Risks, benefits, complications, and alternatives discussed with patient including possibility of limitations from coexistent eye disease and loss of vision.  Target refraction and multifocal lens options discussed.  Patient understands and consents.  Eligio Coates M.D.    NB:  Was sick with anesthetic last time; told to mention so Zofran could be given earlier.

## 2017-11-20 NOTE — MR AVS SNAPSHOT
After Visit Summary   11/20/2017    Alisa Ballard    MRN: [id number]           Patient Information     Date Of Birth          1940        Visit Information        Provider Department      11/20/2017 8:45 AM Eligio Coates MD [facility]        Today's Diagnoses     Combined forms of age-related cataract, mod, of left eye    -  1      Care Instructions    PRE-OP CATARACT INSTRUCTIONS    *You will be picking up 2 eye drop bottles from your pharmacy.    *Use the following drops in the left eye  3 times a day the day before surgery and once the morning of surgery:                                    Vigamox (tan cap)    *If taking more than one drop, wait five minutes between drops.    *No solid food after midnight.    *Clear liquids: coffee (no cream), tea, water, and jello are OK before 6:30 A.M.  You may take your regular pills with this.    *If you are taking glaucoma drops, continue as usual.    Eligio Coates M.D.            Follow-ups after your visit        Your next 10 appointments already scheduled     Nov 28, 2017 12:45 PM CST   Return Visit with Eligio Coates MD   [facility] ([facility]    [address] [place] [address]   [phone]            Dec 05, 2017 12:45 PM CST   [facility] [address]   [phone]            Jan 02, 2018  2:15 PM CST   Return Visit with Eligio Coates MD   [facility] ([address] [address]   [phone]              Who to contact     If you have questions or need follow up information about today's clinic visit or your schedule please contact [facility] directly at [phone].  Normal or non-critical lab and imaging results will be communicated to you by MyChart, letter or phone within 4 business  "days after the clinic has received the results. If you do not hear from us within 7 days, please contact the clinic through Synker or phone. If you have a critical or abnormal lab result, we will notify you by phone as soon as possible.  Submit refill requests through Synker or call your pharmacy and they will forward the refill request to us. Please allow 3 business days for your refill to be completed.          Additional Information About Your Visit        Synker Information     Synker lets you send messages to your doctor, view your test results, renew your prescriptions, schedule appointments and more. To sign up, go to [email or web address] . Click on \"Log in\" on the left side of the screen, which will take you to the Welcome page. Then click on \"Sign up Now\" on the right side of the page.     You will be asked to enter the access code listed below, as well as some personal information. Please follow the directions to create your username and password.     Your access code is: [email or web address]  Expires: 2017  9:30 AM     Your access code will  in 90 days. If you need help or a new code, please call your [place] clinic or [phone].        Care EveryWhere ID     This is your Care EveryWhere ID. This could be used by other organizations to access your [place] medical records  [id number]         Blood Pressure from Last 3 Encounters:   13 128/82   08/10/10 98/68   07/13/10 120/73    Weight from Last 3 Encounters:   13 75.8 kg (167 lb)   08/10/10 78.7 kg (173 lb 9.6 oz)   07/13/10 81.3 kg (179 lb 3.2 oz)              We Performed the Following     IOL MASTER (2nd eye)          Today's Medication Changes          These changes are accurate as of: 17  9:12 AM.  If you have any questions, ask your nurse or doctor.               These medicines have changed or have updated prescriptions.        Dose/Directions    * diclofenac 0.1 % ophthalmic solution   Commonly known as:  " VOLTAREN   This may have changed:  Another medication with the same name was added. Make sure you understand how and when to take each.   Used for:  Combined forms of age-related cataract of both eyes        Dose:  1 drop   Place 1 drop into the right eye 3 times daily   Quantity:  5 mL   Refills:  0       * diclofenac 0.1 % ophthalmic solution   Commonly known as:  VOLTAREN   This may have changed:  You were already taking a medication with the same name, and this prescription was added. Make sure you understand how and when to take each.   Used for:  Combined forms of age-related cataract of left eye        Dose:  1 drop   Start taking on:  11/28/2017   Place 1 drop Into the left eye 3 times daily   Quantity:  5 mL   Refills:  0       * prednisoLONE acetate 1 % ophthalmic susp   Commonly known as:  PRED FORTE   This may have changed:  Another medication with the same name was added. Make sure you understand how and when to take each.   Used for:  Combined forms of age-related cataract of both eyes        Dose:  1 drop   Place 1 drop into the right eye 3 times daily   Quantity:  5 mL   Refills:  0       * prednisoLONE acetate 1 % ophthalmic susp   Commonly known as:  PRED FORTE   This may have changed:  You were already taking a medication with the same name, and this prescription was added. Make sure you understand how and when to take each.   Used for:  Combined forms of age-related cataract of left eye        Dose:  1 drop   Start taking on:  11/28/2017   Place 1 drop Into the left eye 3 times daily   Quantity:  5 mL   Refills:  0       * Notice:  This list has 4 medication(s) that are the same as other medications prescribed for you. Read the directions carefully, and ask your doctor or other care provider to review them with you.         Where to get your medicines      These medications were sent to [id number] - JUANITA TAVAREZ - [address]  [address] [place] [address]     Phone:   [phone]     diclofenac 0.1 % ophthalmic solution    prednisoLONE acetate 1 % ophthalmic susp                Primary Care Provider Office Phone # Fax #    [facility] [phone] [address] [facility] [address]        Equal Access to Services     SYLVIA BOLANOS : Gonsalo lance haddanielleo Soomaali, waaxda luqadaha, qaybta kaalmada adeegyada, waxbetsy jaycob kingalaureen housepeggytalia long. So [facility] [phone].    ATENCIÓN: Si habla español, tiene a edmond disposición servicios gratuitos de asistencia lingüística. Jakub al [phone].    We comply with applicable federal civil rights laws and [place] laws. We do not discriminate on the basis of race, color, national origin, age, disability, sex, sexual orientation, or gender identity.            Thank you!     Thank you for choosing [facility]  for your care. Our goal is always to provide you with excellent care. Hearing back from our patients is one way we can continue to improve our services. Please take a few minutes to complete the written survey that you may receive in the mail after your visit with us. Thank you!             Your Updated Medication List - Protect others around you: Learn how to safely use, store and throw away your medicines at www.disposemymeds.org.          This list is accurate as of: 11/20/17  9:12 AM.  Always use your most recent med list.                   Brand Name Dispense Instructions for use Diagnosis    CALCIUM 500 +D PO      daily        * diclofenac 0.1 % ophthalmic solution    VOLTAREN    5 mL    Place 1 drop into the right eye 3 times daily    Combined forms of age-related cataract of both eyes       * diclofenac 0.1 % ophthalmic solution   Start taking on:  11/28/2017    VOLTAREN    5 mL    Place 1 drop Into the left eye 3 times daily    Combined forms of age-related cataract of left eye       GLUCOSAMINE HCL           moxifloxacin 0.5 % ophthalmic solution    VIGAMOX    1  Bottle    Place 1 drop into the right eye 3 times daily    Combined forms of age-related cataract of both eyes       MULTI-B COMPLEX PO      daily        MULTI-VITAMIN PO           * prednisoLONE acetate 1 % ophthalmic susp    PRED FORTE    5 mL    Place 1 drop into the right eye 3 times daily    Combined forms of age-related cataract of both eyes       * prednisoLONE acetate 1 % ophthalmic susp   Start taking on:  11/28/2017    PRED FORTE    5 mL    Place 1 drop Into the left eye 3 times daily    Combined forms of age-related cataract of left eye       VITAMIN D PO      None Entered        * Notice:  This list has 4 medication(s) that are the same as other medications prescribed for you. Read the directions carefully, and ask your doctor or other care provider to review them with you.

## 2017-11-27 PROBLEM — H25.812 COMBINED FORMS OF AGE-RELATED CATARACT OF LEFT EYE: Status: RESOLVED | Noted: 2017-09-15 | Resolved: 2017-11-27

## 2017-11-28 ENCOUNTER — OFFICE VISIT (OUTPATIENT)
Dept: OPHTHALMOLOGY | Facility: CLINIC | Age: 77
End: 2017-11-28
Payer: COMMERCIAL

## 2017-11-28 DIAGNOSIS — Z96.1 PSEUDOPHAKIA: Primary | ICD-10-CM

## 2017-11-28 PROCEDURE — 99024 POSTOP FOLLOW-UP VISIT: CPT | Performed by: OPHTHALMOLOGY

## 2017-11-28 ASSESSMENT — SLIT LAMP EXAM - LIDS: COMMENTS: NORMAL

## 2017-11-28 ASSESSMENT — TONOMETRY: OS_IOP_MMHG: 16

## 2017-11-28 ASSESSMENT — VISUAL ACUITY
METHOD: SNELLEN - LINEAR
OS_PH_SC: 20/30
OS_SC+: +1
OS_SC: 20/100

## 2017-11-28 NOTE — MR AVS SNAPSHOT
After Visit Summary   11/28/2017    Alisa Ballard    MRN: [id number]           Patient Information     Date Of Birth          1940        Visit Information        Provider Department      11/28/2017 12:45 PM Eligio Coates MD [facility]        Today's Diagnoses     Pseudophakia, ou    -  1      Care Instructions    POST-OP CATARACT INSTRUCTIONS    Use the following drops in the left eye:    Vigamox (tan cap) 3 times a day .  Prednisolone (pink or white cap) 3 times a day  Diclofenac (gray cap) 3 times a day    ~Wait at least 5 minutes between drops.    ~Wear eye shield at night for one week.    ~Do not exert yourself to the point that you are red in the face for one week.    ~If your vision worsens, eye becomes increasingly red, or becomes painful, call [phone].    ~If you are taking glaucoma medications, continue as usual.    ~OK to resume aspirin and/or other blood thinners.    Eligio Coates M.D.              Follow-ups after your visit        Your next 10 appointments already scheduled     Dec 05, 2017 12:45 PM CST   Return Visit with Eligio Coates MD   [facility] ([address] [address] [phone]            Jan 02, 2018  2:15 PM CST   [facility] Visit with [facility] MD   [facility] ([address] [address] [phone]              Who to contact     If you have questions or need follow up information about today's clinic visit or your schedule please contact [facility] directly at [phone].  Normal or non-critical lab and imaging results will be communicated to you by MyChart, letter or phone within 4 business days after the clinic has received the results. If you do not hear from us within 7 days, please contact the clinic through MyChart or phone. If you have a critical or abnormal lab result, we will  "notify you by phone as soon as possible.  Submit refill requests through ShareWithU or call your pharmacy and they will forward the refill request to us. Please allow 3 business days for your refill to be completed.          Additional Information About Your Visit        BuyWithMehart Information     ShareWithU lets you send messages to your doctor, view your test results, renew your prescriptions, schedule appointments and more. To sign up, go to [email or web address] . Click on \"Log in\" on the left side of the screen, which will take you to the Welcome page. Then click on \"Sign up Now\" on the right side of the page.     You will be asked to enter the access code listed below, as well as some personal information. Please follow the directions to create your username and password.     Your access code is: [email or web address]  Expires: 2017  9:30 AM     Your access code will  in 90 days. If you need help or a new code, please call your [place] clinic or [phone].        Care EveryWhere ID     This is your Care EveryWhere ID. This could be used by other organizations to access your [place] medical records  [id number]         Blood Pressure from Last 3 Encounters:   13 128/82   08/10/10 98/68   07/13/10 120/73    Weight from Last 3 Encounters:   13 75.8 kg (167 lb)   08/10/10 78.7 kg (173 lb 9.6 oz)   07/13/10 81.3 kg (179 lb 3.2 oz)              Today, you had the following     No orders found for display       Primary Care Provider Office Phone # Fax #    [facility] [phone] [address] [facility] [address]        Equal Access to Services     SYLVIA BOLANOS : Gonsalo Noguera, david fernández, parth kaalmada katherin, micky long. So [facility] [phone].    ATENCIÓN: Si habla español, tiene a edmond disposición servicios gratuitos de asistencia lingüística. Jakub al [phone].    We comply with applicable " federal civil rights laws and [place] laws. We do not discriminate on the basis of race, color, national origin, age, disability, sex, sexual orientation, or gender identity.            Thank you!     Thank you for choosing [facility]  for your care. Our goal is always to provide you with excellent care. Hearing back from our patients is one way we can continue to improve our services. Please take a few minutes to complete the written survey that you may receive in the mail after your visit with us. Thank you!             Your Updated Medication List - Protect others around you: Learn how to safely use, store and throw away your medicines at www.disposemymeds.org.          This list is accurate as of: 11/28/17  1:14 PM.  Always use your most recent med list.                   Brand Name Dispense Instructions for use Diagnosis    CALCIUM 500 +D PO      daily        diclofenac 0.1 % ophthalmic solution    VOLTAREN    5 mL    Place 1 drop Into the left eye 3 times daily    Combined forms of age-related cataract of left eye       GLUCOSAMINE HCL           moxifloxacin 0.5 % ophthalmic solution    VIGAMOX    1 Bottle    Place 1 drop into the right eye 3 times daily    Combined forms of age-related cataract of both eyes       MULTI-B COMPLEX PO      daily        MULTI-VITAMIN PO           prednisoLONE acetate 1 % ophthalmic susp    PRED FORTE    5 mL    Place 1 drop Into the left eye 3 times daily    Combined forms of age-related cataract of left eye       VITAMIN D PO      None Entered

## 2017-11-28 NOTE — PROGRESS NOTES
Current Eye Medications:  Vigamox TID left eye, had from the right eye 2 months ago     Subjective: here for PO1 left eye.  Feels good to get the patch off.  Slept well, but had a headache this am that had to take the tylenol for it.      Objective:  See Ophthalmology Exam.       Assessment: Doing well status/post Kelman phacoemulsification/ posterior chamber lens left eye.      Plan:   See Patient Instructions.

## 2017-11-28 NOTE — PATIENT INSTRUCTIONS
POST-OP CATARACT INSTRUCTIONS    Use the following drops in the left eye:    Vigamox (tan cap) 3 times a day .  Prednisolone (pink or white cap) 3 times a day  Diclofenac (gray cap) 3 times a day    ~Wait at least 5 minutes between drops.    ~Wear eye shield at night for one week.    ~Do not exert yourself to the point that you are red in the face for one week.    ~If your vision worsens, eye becomes increasingly red, or becomes painful, call [phone].    ~If you are taking glaucoma medications, continue as usual.    ~OK to resume aspirin and/or other blood thinners.    Eligio Coates M.D.

## 2017-12-05 ENCOUNTER — OFFICE VISIT (OUTPATIENT)
Dept: OPHTHALMOLOGY | Facility: CLINIC | Age: 77
End: 2017-12-05
Payer: COMMERCIAL

## 2017-12-05 DIAGNOSIS — Z96.1 PSEUDOPHAKIA: Primary | ICD-10-CM

## 2017-12-05 PROCEDURE — 99024 POSTOP FOLLOW-UP VISIT: CPT | Performed by: OPHTHALMOLOGY

## 2017-12-05 ASSESSMENT — REFRACTION_MANIFEST
OS_AXIS: 087
OS_SPHERE: -2.25
OS_CYLINDER: +0.50

## 2017-12-05 ASSESSMENT — SLIT LAMP EXAM - LIDS: COMMENTS: NORMAL

## 2017-12-05 ASSESSMENT — VISUAL ACUITY
METHOD: SNELLEN - LINEAR
OS_PH_SC: J2+
OS_SC: 20/70

## 2017-12-05 ASSESSMENT — TONOMETRY
OS_IOP_MMHG: 15
IOP_METHOD: TONOPEN

## 2017-12-05 NOTE — MR AVS SNAPSHOT
"              After Visit Summary   12/5/2017    Alisa Ballard    MRN: [id number]           Patient Information     Date Of Birth          1940        Visit Information        Provider Department      12/5/2017 12:45 PM Eligio Coates MD [facility]        Care Instructions    1) Continue all drops three times daily until gone.    2)  Stop using shield after one week one week after surgery.    3)  Return for final exam as scheduled in about one month.    Eligio Coates M.D.          Follow-ups after your visit        Your next 10 appointments already scheduled     Jan 02, 2018  2:15 PM CST   Return Visit with Eligio Coates MD   [facility]    [address] [address] [phone]              Who to contact     If you have questions or need follow up information about today's clinic visit or your schedule please contact [facility] directly at [phone].  Normal or non-critical lab and imaging results will be communicated to you by Blink (air taxi)hart, letter or phone within 4 business days after the clinic has received the results. If you do not hear from us within 7 days, please contact the clinic through Blink (air taxi)hart or phone. If you have a critical or abnormal lab result, we will notify you by phone as soon as possible.  Submit refill requests through CUPR or call your pharmacy and they will forward the refill request to us. Please allow 3 business days for your refill to be completed.          Additional Information About Your Visit        Blink (air taxi)hart Information     CUPR lets you send messages to your doctor, view your test results, renew your prescriptions, schedule appointments and more. To sign up, go to [email or web address] . Click on \"Log in\" on the left side of the screen, which will take you to the Welcome page. Then click on \"Sign up Now\" on the right side of the page.     You will be asked to enter the " access code listed below, as well as some personal information. Please follow the directions to create your username and password.     Your access code is: [email or web address]  Expires: 2017  9:30 AM     Your access code will  in 90 days. If you need help or a new code, please call your [place] clinic or [phone].        Care EveryWhere ID     This is your Care EveryWhere ID. This could be used by other organizations to access your [place] medical records  [id number]         Blood Pressure from Last 3 Encounters:   13 128/82   08/10/10 98/68   07/13/10 120/73    Weight from Last 3 Encounters:   13 75.8 kg (167 lb)   08/10/10 78.7 kg (173 lb 9.6 oz)   07/13/10 81.3 kg (179 lb 3.2 oz)              Today, you had the following     No orders found for display       Primary Care Provider Office Phone # Fax #    [facility] [phone] [address] [facility] [address]        Equal Access to Services     CAMPOS BOLANOS : Hadii aad ku hadasho Soomaali, waaxda luqadaha, qaybta kaalmada adeegyada, micky coffey . So [facility] [phone].    ATENCIÓN: Si habla español, tiene a edmond disposición servicios gratuitos de asistencia lingüística. [facility] [phone].    We comply with applicable federal civil rights laws and [place] laws. We do not discriminate on the basis of race, color, national origin, age, disability, sex, sexual orientation, or gender identity.            Thank you!     Thank you for choosing [facility]  for your care. Our goal is always to provide you with excellent care. Hearing back from our patients is one way we can continue to improve our services. Please take a few minutes to complete the written survey that you may receive in the mail after your visit with us. Thank you!             Your Updated Medication List - Protect others around you: Learn how to safely use, store and throw away your  medicines at www.disposemymeds.org.          This list is accurate as of: 12/5/17  1:10 PM.  Always use your most recent med list.                   Brand Name Dispense Instructions for use Diagnosis    CALCIUM 500 +D PO      daily        diclofenac 0.1 % ophthalmic solution    VOLTAREN    5 mL    Place 1 drop Into the left eye 3 times daily    Combined forms of age-related cataract of left eye       GLUCOSAMINE HCL           moxifloxacin 0.5 % ophthalmic solution    VIGAMOX    1 Bottle    Place 1 drop into the right eye 3 times daily    Combined forms of age-related cataract of both eyes       MULTI-B COMPLEX PO      daily        MULTI-VITAMIN PO           prednisoLONE acetate 1 % ophthalmic susp    PRED FORTE    5 mL    Place 1 drop Into the left eye 3 times daily    Combined forms of age-related cataract of left eye       VITAMIN D PO      None Entered

## 2017-12-05 NOTE — PATIENT INSTRUCTIONS
1) Continue all drops three times daily until gone.    2)  Stop using shield after one week one week after surgery.    3)  Return for final exam as scheduled in about one month.    Eligio Coates M.D.

## 2017-12-05 NOTE — PROGRESS NOTES
Current Eye Medications: Vigamox/diclofenac/1%prednisolone tid left eye     Subjective:  Po2 left eye  Pt reports her vision in this eye seems a little blurry, however the eye feels fine.     Objective:  See Ophthalmology Exam.       Assessment: Doing well status/post Kelman phacoemulsification/ posterior chamber lens left eye .      Plan:   See Patient Instructions.

## 2018-01-02 ENCOUNTER — APPOINTMENT (OUTPATIENT)
Dept: OPTOMETRY | Facility: CLINIC | Age: 78
End: 2018-01-02
Payer: COMMERCIAL

## 2018-01-02 ENCOUNTER — OFFICE VISIT (OUTPATIENT)
Dept: OPHTHALMOLOGY | Facility: CLINIC | Age: 78
End: 2018-01-02
Payer: COMMERCIAL

## 2018-01-02 DIAGNOSIS — Z96.1 PSEUDOPHAKIA: Primary | ICD-10-CM

## 2018-01-02 PROCEDURE — V2020 VISION SVCS FRAMES PURCHASES: HCPCS | Performed by: OPHTHALMOLOGY

## 2018-01-02 PROCEDURE — V2025 EYEGLASSES DELUX FRAMES: HCPCS | Mod: GA | Performed by: OPHTHALMOLOGY

## 2018-01-02 PROCEDURE — V2784 LENS POLYCARB OR EQUAL: HCPCS | Mod: RT | Performed by: OPHTHALMOLOGY

## 2018-01-02 PROCEDURE — 99024 POSTOP FOLLOW-UP VISIT: CPT | Performed by: OPHTHALMOLOGY

## 2018-01-02 PROCEDURE — V2300 LENS SPHERE TRIFOCAL 4.00D: HCPCS | Mod: RT | Performed by: OPHTHALMOLOGY

## 2018-01-02 PROCEDURE — V2750 ANTI-REFLECTIVE COATING: HCPCS | Mod: LT | Performed by: OPHTHALMOLOGY

## 2018-01-02 ASSESSMENT — CUP TO DISC RATIO: OS_RATIO: 0.4

## 2018-01-02 ASSESSMENT — EXTERNAL EXAM - LEFT EYE: OS_EXAM: 2+ BROW PTOSIS, MILD TO MOD BROW

## 2018-01-02 ASSESSMENT — REFRACTION_MANIFEST
OD_SPHERE: -1.50
OS_CYLINDER: +0.75
OD_AXIS: 107
OS_SPHERE: -2.25
OS_AXIS: 107
OS_ADD: +3.00
OD_ADD: +3.00
OD_CYLINDER: +1.25

## 2018-01-02 ASSESSMENT — VISUAL ACUITY
OS_SC: 20/100+1
METHOD: SNELLEN - LINEAR
OD_SC: 20/50+1

## 2018-01-02 ASSESSMENT — TONOMETRY
IOP_METHOD: APPLANATION
OD_IOP_MMHG: 16
OS_IOP_MMHG: 16

## 2018-01-02 ASSESSMENT — EXTERNAL EXAM - RIGHT EYE: OD_EXAM: 2+ BROW PTOSIS, MILD TO MOD BROW

## 2018-01-02 NOTE — PATIENT INSTRUCTIONS
1)  Discontinue all drops, unless used prior to cataract surgery.    2)   Fill Rx for new glasses or drugstore readers.    3)   Return to clinic in three months for a pressure check or earlier if problems should arise.    Eligio Coates M.D.

## 2018-01-02 NOTE — MR AVS SNAPSHOT
"              After Visit Summary   1/2/2018    Alisa Ballard    MRN: [id number]           Patient Information     Date Of Birth          1940        Visit Information        Provider Department      1/2/2018 2:15 PM Eligio Coates MD [facility]        Care Instructions    1)  Discontinue all drops, unless used prior to cataract surgery.    2)   Fill Rx for new glasses or drugstore readers.    3)   Return to clinic in three months for a pressure check or earlier if problems should arise.    Eligio Coates M.D.               Follow-ups after your visit        Who to contact     If you have questions or need follow up information about today's clinic visit or your schedule please contact [facility] directly at [phone].  Normal or non-critical lab and imaging results will be communicated to you by MyChart, letter or phone within 4 business days after the clinic has received the results. If you do not hear from us within 7 days, please contact the clinic through MyChart or phone. If you have a critical or abnormal lab result, we will notify you by phone as soon as possible.  Submit refill requests through [employer] or call your pharmacy and they will forward the refill request to us. Please allow 3 business days for your refill to be completed.          Additional Information About Your Visit        MyChart Information     [employer] lets you send messages to your doctor, view your test results, renew your prescriptions, schedule appointments and more. To sign up, go to [email or web address] . Click on \"Log in\" on the left side of the screen, which will take you to the Welcome page. Then click on \"Sign up Now\" on the right side of the page.     You will be asked to enter the access code listed below, as well as some personal information. Please follow the directions to create your username and password.     Your access code is: [email or web address]  Expires: 4/2/2018  2:59 PM     Your " access code will  in 90 days. If you need help or a new code, please call your [place] clinic or [phone].        Care EveryWhere ID     This is your Care EveryWhere ID. This could be used by other organizations to access your [place] medical records  [id number]         Blood Pressure from Last 3 Encounters:   13 128/82   08/10/10 98/68   07/13/10 120/73    Weight from Last 3 Encounters:   13 75.8 kg (167 lb)   08/10/10 78.7 kg (173 lb 9.6 oz)   07/13/10 81.3 kg (179 lb 3.2 oz)              Today, you had the following     No orders found for display       Primary Care Provider Office Phone # Fax #    [facility] [phone] [address] [facility] [address]        Equal Access to Services     SYLVIA BOLANOS : Hadii aad ku hadasho Soomaali, waaxda luqadaha, qaybta kaalmada adeegyada, waxay seanin haytommy coffey . So [facility] [phone].    ATENCIÓN: Si habla español, tiene a edmond disposición servicios gratuitos de asistencia lingüística. Jakub al [phone].    We comply with applicable federal civil rights laws and [place] laws. We do not discriminate on the basis of race, color, national origin, age, disability, sex, sexual orientation, or gender identity.            Thank you!     Thank you for choosing [facility]  for your care. Our goal is always to provide you with excellent care. Hearing back from our patients is one way we can continue to improve our services. Please take a few minutes to complete the written survey that you may receive in the mail after your visit with us. Thank you!             Your Updated Medication List - Protect others around you: Learn how to safely use, store and throw away your medicines at www.disposemymeds.org.          This list is accurate as of: 18  2:59 PM.  Always use your most recent med list.                   Brand Name Dispense Instructions for use Diagnosis    CALCIUM  500 +D PO      daily        diclofenac 0.1 % ophthalmic solution    VOLTAREN    5 mL    Place 1 drop Into the left eye 3 times daily    Combined forms of age-related cataract of left eye       GLUCOSAMINE HCL           moxifloxacin 0.5 % ophthalmic solution    VIGAMOX    1 Bottle    Place 1 drop into the right eye 3 times daily    Combined forms of age-related cataract of both eyes       MULTI-B COMPLEX PO      daily        MULTI-VITAMIN PO           prednisoLONE acetate 1 % ophthalmic susp    PRED FORTE    5 mL    Place 1 drop Into the left eye 3 times daily    Combined forms of age-related cataract of left eye       VITAMIN D PO      None Entered

## 2018-05-31 ENCOUNTER — TELEPHONE (OUTPATIENT)
Dept: OPHTHALMOLOGY | Facility: CLINIC | Age: 78
End: 2018-05-31

## 2018-05-31 NOTE — TELEPHONE ENCOUNTER
Reason for call:  Other   Patient called regarding (reason for call): call back  Additional comments: Alisa called to say that her left eye is a little hazy and some mattery in the morning and was wondering if there was something she could do or if she needs to be seen. I did let her know that someone would probably give her a call tomorrow.    Phone number to reach patient:  Cell number on file:    Telephone Information:   Mobile [phone]       Best Time:  any    Can we leave a detailed message on this number?  YES

## 2018-06-01 NOTE — TELEPHONE ENCOUNTER
Spoke to patient. She was lifting heavy planters a few days ago, and now the right eye is bloodshot.  She doesn't have pain or vision loss, just red.  Reassured that sometimes this redness can happen when lifting heavy things or exerting energy in doing something.  Also mentions a film over the left eye that seems to come and go a bit. PCO was mentioned last visit and patient did not make three month appointment for recheck in April.  She says that the eyes water and not using any tears.  Asked her to try to use artificial tears four times a day and we made her appointment for June 14th at 10:15 for TA, dilate left eye to see if she needs a YAG caps laser. She will use the tears and come for appointment in June.

## 2018-06-14 ENCOUNTER — OFFICE VISIT (OUTPATIENT)
Dept: OPHTHALMOLOGY | Facility: CLINIC | Age: 78
End: 2018-06-14
Payer: COMMERCIAL

## 2018-06-14 DIAGNOSIS — Z96.1 PSEUDOPHAKIA: Primary | ICD-10-CM

## 2018-06-14 PROCEDURE — 92012 INTRM OPH EXAM EST PATIENT: CPT | Performed by: OPHTHALMOLOGY

## 2018-06-14 ASSESSMENT — EXTERNAL EXAM - RIGHT EYE: OD_EXAM: 2+ BROW PTOSIS, MILD TO MOD BROW

## 2018-06-14 ASSESSMENT — VISUAL ACUITY
OD_CC: 20/20
CORRECTION_TYPE: GLASSES
OS_CC: 20/25
OS_CC+: -1
METHOD: SNELLEN - LINEAR

## 2018-06-14 ASSESSMENT — TONOMETRY
IOP_METHOD: APPLANATION
OD_IOP_MMHG: 14
OS_IOP_MMHG: 15

## 2018-06-14 ASSESSMENT — EXTERNAL EXAM - LEFT EYE: OS_EXAM: 2+ BROW PTOSIS, MILD TO MOD BROW

## 2018-06-14 ASSESSMENT — CUP TO DISC RATIO: OS_RATIO: 0.4

## 2018-06-14 NOTE — PATIENT INSTRUCTIONS
Use artificial tears up to 4 times daily both eyes.  (Refresh Tears, Systane Ultra/Balance, or Theratears).  Possible clouding of posterior capsule discussed.  Return visit in 6 months for complete exam.     Eligio Coates M.D.  [phone]

## 2018-06-14 NOTE — LETTER
6/14/2018         RE: Alisa Ballard  [address] [place] [address]        Dear Colleague,    Thank you for referring your patient, Alisa Ballard, to the [facility]. Please see a copy of my visit note below.     Current Eye Medications:  Artificial tears as needed both eyes.     Subjective:  5 month IOP check and also having cloudy vision, worse in the AM. left eye. Vision is doing well right. No eye pain or discomfort in either eye.      Objective:  See Ophthalmology Exam.       Assessment:  Stable dilated exam left eye; posterior capsule opacity not yet visually significant.      Plan:  Use artificial tears up to 4 times daily both eyes.  (Refresh Tears, Systane Ultra/Balance, or Theratears).  Possible clouding of posterior capsule discussed.  Return visit in 6 months for complete exam.     Eligio Coates M.D.  [phone]           Again, thank you for allowing me to participate in the care of your patient.        Sincerely,        Eligio Coates MD

## 2018-06-14 NOTE — MR AVS SNAPSHOT
After Visit Summary   6/14/2018    Alisa Ballard    MRN: [id number]           Patient Information     Date Of Birth          1940        Visit Information        Provider Department      6/14/2018 10:15 AM Eligio Coates MD [facility]        Today's Diagnoses     Pseudophakia, ou    -  1      Care Instructions    Use artificial tears up to 4 times daily both eyes.  (Refresh Tears, Systane Ultra/Balance, or Theratears).  Possible clouding of posterior capsule discussed.  Return visit in 6 months for complete exam.     Eligio Coates M.D.  [phone]            Follow-ups after your visit        Who to contact     If you have questions or need follow up information about today's clinic visit or your schedule please contact [facility] directly at [phone].  Normal or non-critical lab and imaging results will be communicated to you by MyChart, letter or phone within 4 business days after the clinic has received the results. If you do not hear from us within 7 days, please contact the clinic through MyChart or phone. If you have a critical or abnormal lab result, we will notify you by phone as soon as possible.  Submit refill requests through [employer] or call your pharmacy and they will forward the refill request to us. Please allow 3 business days for your refill to be completed.          Additional Information About Your Visit        Care EveryWhere ID     This is your Care EveryWhere ID. This could be used by other organizations to access your [place] medical records  [id number]         Blood Pressure from Last 3 Encounters:   05/14/13 128/82   08/10/10 98/68   07/13/10 120/73    Weight from Last 3 Encounters:   05/14/13 75.8 kg (167 lb)   08/10/10 78.7 kg (173 lb 9.6 oz)   07/13/10 81.3 kg (179 lb 3.2 oz)              Today, you had the following     No orders found for display       Primary Care Provider Office Phone # Fax #    [facility]  [phone]       [address] [facility] [address]        Equal Access to Services     SYLVIA BOLANOS : Hadii aad ku haddaniellebaldo Katherineluis, david sameermarquise, parth palmerda katherin, micky burkett janamatthias cr laEnochtommy ethan. So [facility] [phone].    ATENCIÓN: Si habla español, tiene a edmond disposición servicios gratuitos de asistencia lingüística. Llame al [phone].    We comply with applicable federal civil rights laws and [place] laws. We do not discriminate on the basis of race, color, national origin, age, disability, sex, sexual orientation, or gender identity.            Thank you!     Thank you for choosing [facility]  for your care. Our goal is always to provide you with excellent care. Hearing back from our patients is one way we can continue to improve our services. Please take a few minutes to complete the written survey that you may receive in the mail after your visit with us. Thank you!             Your Updated Medication List - Protect others around you: Learn how to safely use, store and throw away your medicines at www.disposemymeds.org.          This list is accurate as of 6/14/18 11:02 AM.  Always use your most recent med list.                   Brand Name Dispense Instructions for use Diagnosis    CALCIUM 500 +D PO      daily        GLUCOSAMINE HCL           MULTI-B COMPLEX PO      daily        MULTI-VITAMIN PO           VITAMIN D PO      None Entered

## 2018-06-14 NOTE — PROGRESS NOTES
Current Eye Medications:  Artificial tears as needed both eyes.     Subjective:  5 month IOP check and also having cloudy vision, worse in the AM. left eye. Vision is doing well right. No eye pain or discomfort in either eye.      Objective:  See Ophthalmology Exam.       Assessment:  Stable dilated exam left eye; posterior capsule opacity not yet visually significant.      Plan:  Use artificial tears up to 4 times daily both eyes.  (Refresh Tears, Systane Ultra/Balance, or Theratears).  Possible clouding of posterior capsule discussed.  Return visit in 6 months for complete exam.     Eligio Coates M.D.  [phone]

## 2018-12-17 ENCOUNTER — OFFICE VISIT (OUTPATIENT)
Dept: OPHTHALMOLOGY | Facility: CLINIC | Age: 78
End: 2018-12-17
Payer: COMMERCIAL

## 2018-12-17 DIAGNOSIS — H43.813 POSTERIOR VITREOUS DETACHMENT OF BOTH EYES: ICD-10-CM

## 2018-12-17 DIAGNOSIS — Z96.1 PSEUDOPHAKIA: Primary | ICD-10-CM

## 2018-12-17 DIAGNOSIS — H52.4 PRESBYOPIA: ICD-10-CM

## 2018-12-17 PROCEDURE — 92015 DETERMINE REFRACTIVE STATE: CPT | Performed by: OPHTHALMOLOGY

## 2018-12-17 PROCEDURE — 92014 COMPRE OPH EXAM EST PT 1/>: CPT | Performed by: OPHTHALMOLOGY

## 2018-12-17 ASSESSMENT — VISUAL ACUITY
METHOD: SNELLEN - LINEAR
OS_CC: J2
OD_CC: 20/20
CORRECTION_TYPE: GLASSES
OS_CC: 20/25-2
OD_CC: J1

## 2018-12-17 ASSESSMENT — REFRACTION_WEARINGRX
OD_ADD: +3.00
OD_AXIS: 070
SPECS_TYPE: BIFOCAL
OD_CYLINDER: +0.75
OS_ADD: +3.00
OD_SPHERE: -1.25
OS_AXIS: 120
OS_SPHERE: -1.75
OS_CYLINDER: +1.00

## 2018-12-17 ASSESSMENT — CUP TO DISC RATIO
OS_RATIO: 0.4
OD_RATIO: 0.3

## 2018-12-17 ASSESSMENT — CONF VISUAL FIELD
OS_NORMAL: 1
OD_NORMAL: 1

## 2018-12-17 ASSESSMENT — REFRACTION_MANIFEST
OS_ADD: +3.00
OD_ADD: +3.00
OS_AXIS: 113
OS_CYLINDER: +1.00
OD_SPHERE: -1.25
OS_SPHERE: -2.75
OD_AXIS: 110
OD_CYLINDER: +0.75

## 2018-12-17 ASSESSMENT — TONOMETRY
OD_IOP_MMHG: 13
IOP_METHOD: APPLANATION
OS_IOP_MMHG: 13

## 2018-12-17 ASSESSMENT — EXTERNAL EXAM - RIGHT EYE: OD_EXAM: 2+ BROW PTOSIS, MILD TO MOD BROW

## 2018-12-17 ASSESSMENT — EXTERNAL EXAM - LEFT EYE: OS_EXAM: 2+ BROW PTOSIS, MILD TO MOD BROW

## 2018-12-17 NOTE — PATIENT INSTRUCTIONS
Glasses Rx given - optional.  Use artificial tears up to 4 times daily both eyes.  (Refresh Tears, Systane Ultra/Balance, or Theratears)  Lid hygiene discussed and encouraged a few times daily.   Could also use on warm compress on the eyes for about 10 minutes a few times daily.  Possible clouding of posterior capsule both eyes discussed.  Call in August 2019 for an appointment in December 2019 for Complete Exam.    Dr. Coates [phone]

## 2018-12-17 NOTE — PROGRESS NOTES
Current Eye Medications: no     Subjective:  Comprehensive eye exam.   Pt reports she continues to see well, however about a month ago her right eye became blood red with no pain or affect on vision for a wk or so, then about a week later for about a week her right eye ached , ache was worse seemed  at night.     Objective:  See Ophthalmology Exam.       Assessment:  Stable eye exam.  Recent subconjunctival hemorrhage right eye.      ICD-10-CM    1. Pseudophakia, ou Z96.1 EYE EXAM (SIMPLE-NONBILLABLE)   2. Posterior vitreous detachment of both eyes H43.813    3. Presbyopia H52.4 REFRACTIVE STATUS        Plan:  Glasses Rx given - optional.  Use artificial tears up to 4 times daily both eyes.  (Refresh Tears, Systane Ultra/Balance, or Theratears)  Lid hygiene discussed and encouraged a few times daily.   Could also use on warm compress on the eyes for about 10 minutes a few times daily.  Possible clouding of posterior capsule both eyes discussed.  Reassured regarding subconjunctival hemorrhage.  Call in August 2019 for an appointment in December 2019 for Complete Exam.    Dr. Coates [phone]

## 2018-12-27 ENCOUNTER — DOCUMENTATION ONLY (OUTPATIENT)
Dept: OPHTHALMOLOGY | Facility: CLINIC | Age: 78
End: 2018-12-27

## 2021-09-01 NOTE — PROGRESS NOTES
Current Eye Medications:  no     Subjective:  Final mr left eye   Pt reports she is seeing good and this eyes eyes seem otherwise fine to her.     Objective:  See Ophthalmology Exam.       Assessment: Doing well status/post Kelman phacoemulsification/ posterior chamber lens left eye.      Plan:   See Patient Instructions.        .

## 2022-11-01 NOTE — PATIENT INSTRUCTIONS
Apply a hot compress for 10 minutes a few times daily. May use a warm wash cloth or a microwaveable pack filled with dry,uncooked rice, gel beads, etc.   Lid hygiene discussed and encouraged for both eyes a few times daily as needed.   May use artificial tears up to 4 times daily both eyes. (Refresh Tears, Systane Ultra/Balance, or Theratears)   Start Doxycycline 50 mg daily.  Use Sulfa/Pred drop twice daily both eyes as needed.  Return visit at your convenience for a complete exam.  Eligio Coates M.D.  [phone]

## 2022-11-02 ENCOUNTER — OFFICE VISIT (OUTPATIENT)
Dept: OPHTHALMOLOGY | Facility: CLINIC | Age: 82
End: 2022-11-02
Payer: COMMERCIAL

## 2022-11-02 DIAGNOSIS — H02.886 MEIBOMIAN GLAND DYSFUNCTION (MGD) OF BOTH EYES: Primary | ICD-10-CM

## 2022-11-02 DIAGNOSIS — H02.883 MEIBOMIAN GLAND DYSFUNCTION (MGD) OF BOTH EYES: Primary | ICD-10-CM

## 2022-11-02 DIAGNOSIS — H01.02A SQUAMOUS BLEPHARITIS OF UPPER AND LOWER EYELIDS OF BOTH EYES: ICD-10-CM

## 2022-11-02 DIAGNOSIS — H01.02B SQUAMOUS BLEPHARITIS OF UPPER AND LOWER EYELIDS OF BOTH EYES: ICD-10-CM

## 2022-11-02 PROCEDURE — 92002 INTRM OPH EXAM NEW PATIENT: CPT | Performed by: OPHTHALMOLOGY

## 2022-11-02 RX ORDER — SULFACETAMIDE SODIUM AND PREDNISOLONE SODIUM PHOSPHATE 100; 2.3 MG/ML; MG/ML
1 SOLUTION/ DROPS OPHTHALMIC 2 TIMES DAILY PRN
Qty: 5 ML | Refills: 3 | Status: SHIPPED | OUTPATIENT
Start: 2022-11-02

## 2022-11-02 RX ORDER — DOXYCYCLINE HYCLATE 50 MG/1
50 CAPSULE ORAL DAILY
Qty: 30 CAPSULE | Refills: 11 | Status: SHIPPED | OUTPATIENT
Start: 2022-11-02

## 2022-11-02 ASSESSMENT — REFRACTION_WEARINGRX
OD_AXIS: 070
OS_SPHERE: -1.75
OS_AXIS: 120
OS_CYLINDER: +1.00
OD_CYLINDER: +0.75
OD_SPHERE: -1.25
OD_ADD: +3.00
SPECS_TYPE: BIFOCAL
OS_ADD: +3.00

## 2022-11-02 ASSESSMENT — TONOMETRY
OS_IOP_MMHG: 10
IOP_METHOD: ICARE
OD_IOP_MMHG: 10

## 2022-11-02 ASSESSMENT — VISUAL ACUITY
OS_CC: 20/20
CORRECTION_TYPE: GLASSES
METHOD: SNELLEN - LINEAR
OD_CC: 20/20

## 2022-11-02 ASSESSMENT — EXTERNAL EXAM - RIGHT EYE: OD_EXAM: 2+ BROW PTOSIS, MILD TO MOD BROW

## 2022-11-02 ASSESSMENT — EXTERNAL EXAM - LEFT EYE: OS_EXAM: 2+ BROW PTOSIS, MILD TO MOD BROW

## 2022-11-02 NOTE — LETTER
11/2/2022         RE: Alisa Ballard  [address]  [address]  [place] [address]        Dear Colleague,    Thank you for referring your patient, Alisa Ballard, to the [facility]. Please see a copy of my visit note below.    Current Eye Medications:      Subjective:  Here for evaluation of blurry vision both eyes and discomfort both eyes.  She complains of redness on both eyes, tearing on both eyes. This started about 3-4 months ago.   She did have some discharge and crusting, but it went away.  She was seen at Urgent care on 09/07 for something else, but the doctor gave her eye drops Polymyxin B Sulfate because she was complaining for the same symptoms. She is using it twice a day since she got it.   Right eye more blurry. Usually blurry in the morning. Clears up as the day goes on.     REINIER Salazar  9:32 AM 11/02/2022    Objective:  See Ophthalmology Exam.      Assessment:  New anterior segment exam in patient with Blepharitis/Meibomitis.     Plan:  Apply a hot compress for 10 minutes a few times daily. May use a warm wash cloth or a microwaveable pack filled with dry,uncooked rice, gel beads, etc.   Lid hygiene discussed and encouraged for both eyes a few times daily as needed.   May use artificial tears up to 4 times daily both eyes. (Refresh Tears, Systane Ultra/Balance, or Theratears)   Start Doxycycline 50 mg daily.  Use Sulfa/Pred drop twice daily both eyes as needed.  Return visit at your convenience for a complete exam.  Eligio Coates M.D.  [phone]       Again, thank you for allowing me to participate in the care of your patient.        Sincerely,        Eligio Coates MD

## 2022-11-02 NOTE — PROGRESS NOTES
Current Eye Medications:      Subjective:  Here for evaluation of blurry vision both eyes and discomfort both eyes.  She complains of redness on both eyes, tearing on both eyes. This started about 3-4 months ago.   She did have some discharge and crusting, but it went away.  She was seen at Urgent care on 09/07 for something else, but the doctor gave her eye drops Polymyxin B Sulfate because she was complaining for the same symptoms. She is using it twice a day since she got it.   Right eye more blurry. Usually blurry in the morning. Clears up as the day goes on.     REINIER Salazar  9:32 AM 11/02/2022    Objective:  See Ophthalmology Exam.      Assessment:  New anterior segment exam in patient with Blepharitis/Meibomitis.     Plan:  Apply a hot compress for 10 minutes a few times daily. May use a warm wash cloth or a microwaveable pack filled with dry,uncooked rice, gel beads, etc.   Lid hygiene discussed and encouraged for both eyes a few times daily as needed.   May use artificial tears up to 4 times daily both eyes. (Refresh Tears, Systane Ultra/Balance, or Theratears)   Start Doxycycline 50 mg daily.  Use Sulfa/Pred drop twice daily both eyes as needed.  Return visit at your convenience for a complete exam.  Eligio Coates M.D.  [phone]

## 2022-11-19 ENCOUNTER — HEALTH MAINTENANCE LETTER (OUTPATIENT)
Age: 82
End: 2022-11-19

## 2023-04-10 ENCOUNTER — OFFICE VISIT (OUTPATIENT)
Dept: OPHTHALMOLOGY | Facility: CLINIC | Age: 83
End: 2023-04-10
Payer: COMMERCIAL

## 2023-04-10 DIAGNOSIS — H02.886 MEIBOMIAN GLAND DYSFUNCTION (MGD) OF BOTH EYES: ICD-10-CM

## 2023-04-10 DIAGNOSIS — Z01.01 ENCOUNTER FOR EXAMINATION OF EYES AND VISION WITH ABNORMAL FINDINGS: Primary | ICD-10-CM

## 2023-04-10 DIAGNOSIS — H43.813 POSTERIOR VITREOUS DETACHMENT OF BOTH EYES: ICD-10-CM

## 2023-04-10 DIAGNOSIS — H52.4 PRESBYOPIA: ICD-10-CM

## 2023-04-10 DIAGNOSIS — H01.02B SQUAMOUS BLEPHARITIS OF UPPER AND LOWER EYELIDS OF BOTH EYES: ICD-10-CM

## 2023-04-10 DIAGNOSIS — Z96.1 PSEUDOPHAKIA: ICD-10-CM

## 2023-04-10 DIAGNOSIS — H01.02A SQUAMOUS BLEPHARITIS OF UPPER AND LOWER EYELIDS OF BOTH EYES: ICD-10-CM

## 2023-04-10 DIAGNOSIS — H02.883 MEIBOMIAN GLAND DYSFUNCTION (MGD) OF BOTH EYES: ICD-10-CM

## 2023-04-10 PROCEDURE — 92014 COMPRE OPH EXAM EST PT 1/>: CPT | Performed by: OPHTHALMOLOGY

## 2023-04-10 PROCEDURE — 92015 DETERMINE REFRACTIVE STATE: CPT | Performed by: OPHTHALMOLOGY

## 2023-04-10 ASSESSMENT — VISUAL ACUITY
OS_CC+: -2
OD_CC: J1-
CORRECTION_TYPE: GLASSES
OS_CC: 20/30
OD_CC: 20/25
OS_CC: J2-
METHOD: SNELLEN - LINEAR

## 2023-04-10 ASSESSMENT — REFRACTION_WEARINGRX
OS_AXIS: 107
OD_AXIS: 106
OS_SPHERE: -2.50
SPECS_TYPE: TRIFOCAL
OS_CYLINDER: +0.75
OD_CYLINDER: +1.25
OD_SPHERE: -1.75
OD_ADD: +3.25
OS_ADD: +3.25

## 2023-04-10 ASSESSMENT — CONF VISUAL FIELD
OS_SUPERIOR_NASAL_RESTRICTION: 0
OD_SUPERIOR_NASAL_RESTRICTION: 0
OD_NORMAL: 1
OS_SUPERIOR_TEMPORAL_RESTRICTION: 0
OD_SUPERIOR_TEMPORAL_RESTRICTION: 0
OS_INFERIOR_TEMPORAL_RESTRICTION: 0
OD_INFERIOR_TEMPORAL_RESTRICTION: 0
OS_INFERIOR_NASAL_RESTRICTION: 0
OD_INFERIOR_NASAL_RESTRICTION: 0
OS_NORMAL: 1

## 2023-04-10 ASSESSMENT — REFRACTION_MANIFEST
OS_ADD: +3.00
OS_SPHERE: -2.75
OD_SPHERE: -1.25
OD_CYLINDER: +0.75
OS_CYLINDER: +0.75
OS_AXIS: 122
OD_ADD: +3.00
OD_AXIS: 137

## 2023-04-10 ASSESSMENT — TONOMETRY
IOP_METHOD: APPLANATION
OS_IOP_MMHG: 15
OD_IOP_MMHG: 18

## 2023-04-10 ASSESSMENT — CUP TO DISC RATIO
OD_RATIO: 0.3
OS_RATIO: 0.4

## 2023-04-10 ASSESSMENT — EXTERNAL EXAM - RIGHT EYE: OD_EXAM: 2+ BROW PTOSIS, MILD TO MOD BROW

## 2023-04-10 ASSESSMENT — EXTERNAL EXAM - LEFT EYE: OS_EXAM: 2+ BROW PTOSIS, MILD TO MOD BROW

## 2023-04-10 NOTE — PROGRESS NOTES
" Current Eye Medications:  Vasocidin both eyes twice a day.  This drop burns with instillation.       Subjective:  Comprehensive Eye Exam.  Vision is blurry in her left eye.  She has difficulty reading the descriptions of programs on the TV Menu.  Also, left eye always feel sticky.  She doesn't feel that the drops have helped her symptoms in any way, and wonders if a Comprehensive Eye Exam should be done today.       Objective:  See Ophthalmology Exam.       Assessment:  Mostly compensated blepharitis on current regimen.  Otherwise stable eye exam.      ICD-10-CM    1. Encounter for examination of eyes and vision with abnormal findings  Z01.01       2. Presbyopia  H52.4       3. Pseudophakia, ou  Z96.1       4. Meibomian gland dysfunction (MGD) of both eyes  H02.883     H02.886       5. Squamous blepharitis of upper and lower eyelids of both eyes  H01.02A     H01.02B       6. Posterior vitreous detachment of both eyes  H43.813            Plan:  Glasses prescription given - optional  May use artificial tears up to four times a day (like Refresh Optive, Systane Balance, TheraTears, or generic artificial tears are ok. Avoid \"get the red out\" drops).   Possible clouding of posterior capsule both eyes discussed.    Continue Doxycycline 50 mg daily.  Use Sulfa/Pred drop twice daily both eyes as needed.  Apply a hot compress for 10 minutes a few times daily. May use a warm wash cloth or a microwaveable pack filled with dry,uncooked rice, gel beads, etc.   Lid hygiene discussed and encouraged for both eyes a few times daily as needed.  Call in December 2023 for an appointment in April 2024 for Complete Exam    Dr. Coates [phone]        "

## 2023-04-10 NOTE — PATIENT INSTRUCTIONS
"Glasses prescription given - optional  May use artificial tears up to four times a day (like Refresh Optive, Systane Balance, TheraTears, or generic artificial tears are ok. Avoid \"get the red out\" drops).   Possible clouding of posterior capsule both eyes discussed.    Continue Doxycycline 50 mg daily.  Use Sulfa/Pred drop twice daily both eyes as needed.  Apply a hot compress for 10 minutes a few times daily. May use a warm wash cloth or a microwaveable pack filled with dry,uncooked rice, gel beads, etc.   Lid hygiene discussed and encouraged for both eyes a few times daily as needed.  Call in December 2023 for an appointment in April 2024 for Complete Exam    Dr. Coates [phone]     "

## 2023-04-10 NOTE — LETTER
"    4/10/2023         RE: Alisa Ballard  [address]  [address]  [place] [address]        Dear Colleague,    Thank you for referring your patient, Alisa Ballard, to the [facility]. Please see a copy of my visit note below.     Current Eye Medications:  Vasocidin both eyes twice a day.  This drop burns with instillation.       Subjective:  Comprehensive Eye Exam.  Vision is blurry in her left eye.  She has difficulty reading the descriptions of programs on the TV Menu.  Also, left eye always feel sticky.  She doesn't feel that the drops have helped her symptoms in any way, and wonders if a Comprehensive Eye Exam should be done today.       Objective:  See Ophthalmology Exam.       Assessment:  Mostly compensated blepharitis on current regimen.  Otherwise stable eye exam.      ICD-10-CM    1. Encounter for examination of eyes and vision with abnormal findings  Z01.01       2. Presbyopia  H52.4       3. Pseudophakia, ou  Z96.1       4. Meibomian gland dysfunction (MGD) of both eyes  H02.883     H02.886       5. Squamous blepharitis of upper and lower eyelids of both eyes  H01.02A     H01.02B       6. Posterior vitreous detachment of both eyes  H43.813            Plan:  Glasses prescription given - optional  May use artificial tears up to four times a day (like Refresh Optive, Systane Balance, TheraTears, or generic artificial tears are ok. Avoid \"get the red out\" drops).   Possible clouding of posterior capsule both eyes discussed.    Continue Doxycycline 50 mg daily.  Use Sulfa/Pred drop twice daily both eyes as needed.  Apply a hot compress for 10 minutes a few times daily. May use a warm wash cloth or a microwaveable pack filled with dry,uncooked rice, gel beads, etc.   Lid hygiene discussed and encouraged for both eyes a few times daily as needed.  Call in December 2023 for an appointment in April 2024 for Complete Exam    Dr. Coates [phone]            Again, thank you for " allowing me to participate in the care of your patient.        Sincerely,        Eligio Coates MD

## 2025-02-01 ENCOUNTER — HEALTH MAINTENANCE LETTER (OUTPATIENT)
Age: 85
End: 2025-02-01

## 2025-03-01 ENCOUNTER — HEALTH MAINTENANCE LETTER (OUTPATIENT)
Age: 85
End: 2025-03-01